# Patient Record
Sex: MALE | Race: WHITE | Employment: OTHER | URBAN - METROPOLITAN AREA
[De-identification: names, ages, dates, MRNs, and addresses within clinical notes are randomized per-mention and may not be internally consistent; named-entity substitution may affect disease eponyms.]

---

## 2020-10-12 ENCOUNTER — HOSPITAL ENCOUNTER (OUTPATIENT)
Age: 75
Setting detail: OUTPATIENT SURGERY
Discharge: HOME | End: 2020-10-12
Attending: SPECIALIST | Admitting: SPECIALIST
Payer: MEDICARE

## 2020-10-12 VITALS
RESPIRATION RATE: 20 BRPM | HEIGHT: 75 IN | BODY MASS INDEX: 33.14 KG/M2 | TEMPERATURE: 97.8 F | OXYGEN SATURATION: 98 % | WEIGHT: 266.54 LBS | DIASTOLIC BLOOD PRESSURE: 69 MMHG | HEART RATE: 83 BPM | SYSTOLIC BLOOD PRESSURE: 110 MMHG

## 2020-10-12 DIAGNOSIS — I48.91 ATRIAL FIBRILLATION (HCC): ICD-10-CM

## 2020-10-12 PROCEDURE — 99152 MOD SED SAME PHYS/QHP 5/>YRS: CPT | Performed by: SPECIALIST

## 2020-10-12 PROCEDURE — 77030004532 HC CATH ANGI DX IMP BSC -A: Performed by: SPECIALIST

## 2020-10-12 PROCEDURE — 77030003390 HC NDL ANGI MRTM -A: Performed by: SPECIALIST

## 2020-10-12 PROCEDURE — C1894 INTRO/SHEATH, NON-LASER: HCPCS | Performed by: SPECIALIST

## 2020-10-12 PROCEDURE — 74011000250 HC RX REV CODE- 250: Performed by: SPECIALIST

## 2020-10-12 PROCEDURE — C1760 CLOSURE DEV, VASC: HCPCS | Performed by: SPECIALIST

## 2020-10-12 PROCEDURE — 93458 L HRT ARTERY/VENTRICLE ANGIO: CPT | Performed by: SPECIALIST

## 2020-10-12 PROCEDURE — 74011000636 HC RX REV CODE- 636: Performed by: SPECIALIST

## 2020-10-12 PROCEDURE — 74011250636 HC RX REV CODE- 250/636: Performed by: SPECIALIST

## 2020-10-12 RX ORDER — HYDROCORTISONE SODIUM SUCCINATE 100 MG/2ML
100 INJECTION, POWDER, FOR SOLUTION INTRAMUSCULAR; INTRAVENOUS
Status: DISCONTINUED | OUTPATIENT
Start: 2020-10-12 | End: 2020-10-12 | Stop reason: HOSPADM

## 2020-10-12 RX ORDER — FUROSEMIDE 40 MG/1
40 TABLET ORAL DAILY
COMMUNITY
End: 2022-02-08

## 2020-10-12 RX ORDER — FENTANYL CITRATE 50 UG/ML
INJECTION, SOLUTION INTRAMUSCULAR; INTRAVENOUS AS NEEDED
Status: DISCONTINUED | OUTPATIENT
Start: 2020-10-12 | End: 2020-10-12 | Stop reason: HOSPADM

## 2020-10-12 RX ORDER — ASCORBIC ACID 500 MG
500 TABLET ORAL EVERY MORNING
COMMUNITY

## 2020-10-12 RX ORDER — LEVOTHYROXINE SODIUM 88 UG/1
88 TABLET ORAL
COMMUNITY

## 2020-10-12 RX ORDER — LIDOCAINE HYDROCHLORIDE 10 MG/ML
INJECTION INFILTRATION; PERINEURAL AS NEEDED
Status: DISCONTINUED | OUTPATIENT
Start: 2020-10-12 | End: 2020-10-12 | Stop reason: HOSPADM

## 2020-10-12 RX ORDER — CHOLECALCIFEROL (VITAMIN D3) 125 MCG
2000 CAPSULE ORAL EVERY MORNING
COMMUNITY

## 2020-10-12 RX ORDER — HEPARIN SODIUM 200 [USP'U]/100ML
INJECTION, SOLUTION INTRAVENOUS
Status: COMPLETED | OUTPATIENT
Start: 2020-10-12 | End: 2020-10-12

## 2020-10-12 RX ORDER — LANOLIN ALCOHOL/MO/W.PET/CERES
400 CREAM (GRAM) TOPICAL DAILY
COMMUNITY

## 2020-10-12 RX ORDER — SODIUM CHLORIDE 9 MG/ML
75 INJECTION, SOLUTION INTRAVENOUS CONTINUOUS
Status: DISCONTINUED | OUTPATIENT
Start: 2020-10-12 | End: 2020-10-12 | Stop reason: HOSPADM

## 2020-10-12 RX ORDER — TAMSULOSIN HYDROCHLORIDE 0.4 MG/1
0.8 CAPSULE ORAL DAILY
COMMUNITY
End: 2022-02-08

## 2020-10-12 RX ORDER — OMEGA-3S/DHA/EPA/FISH OIL 350-600 MG
600 CAPSULE ORAL
COMMUNITY

## 2020-10-12 RX ORDER — SODIUM CHLORIDE 0.9 % (FLUSH) 0.9 %
5-40 SYRINGE (ML) INJECTION EVERY 8 HOURS
Status: DISCONTINUED | OUTPATIENT
Start: 2020-10-12 | End: 2020-10-12 | Stop reason: HOSPADM

## 2020-10-12 RX ORDER — DIGOXIN 125 MCG
0.12 TABLET ORAL DAILY
COMMUNITY
End: 2022-02-08

## 2020-10-12 RX ORDER — DIPHENHYDRAMINE HYDROCHLORIDE 50 MG/ML
25 INJECTION, SOLUTION INTRAMUSCULAR; INTRAVENOUS
Status: DISCONTINUED | OUTPATIENT
Start: 2020-10-12 | End: 2020-10-12 | Stop reason: HOSPADM

## 2020-10-12 RX ORDER — MIDAZOLAM HYDROCHLORIDE 1 MG/ML
INJECTION, SOLUTION INTRAMUSCULAR; INTRAVENOUS AS NEEDED
Status: DISCONTINUED | OUTPATIENT
Start: 2020-10-12 | End: 2020-10-12 | Stop reason: HOSPADM

## 2020-10-12 RX ORDER — SODIUM CHLORIDE 0.9 % (FLUSH) 0.9 %
5-40 SYRINGE (ML) INJECTION AS NEEDED
Status: DISCONTINUED | OUTPATIENT
Start: 2020-10-12 | End: 2020-10-12 | Stop reason: HOSPADM

## 2020-10-12 RX ORDER — BISMUTH SUBSALICYLATE 262 MG
1 TABLET,CHEWABLE ORAL DAILY
COMMUNITY

## 2020-10-12 RX ORDER — POTASSIUM CITRATE 10 MEQ/1
10 TABLET, EXTENDED RELEASE ORAL
COMMUNITY
End: 2022-02-08

## 2020-10-12 NOTE — DISCHARGE INSTRUCTIONS
Cardiac Catheterization/Angiography Discharge Instructions    *Check the puncture site frequently for swelling or bleeding. If you see any bleeding, lie down and apply pressure over the area with a clean town or washcloth. Notify your doctor for any redness, swelling, drainage or oozing from the puncture site. Notify your doctor for any fever or chills. *If the leg or arm with the puncture becomes cold, numb or painful, call Dr Judy Kasper at  [phone]    *Activity should be limited for the next 48 hours. Climb stairs as little as possible and avoid any stooping, bending or strenuous activity for 48 hours. No heavy lifting (anything over 10 pounds) for three days. *Do not drive for 48 hours. *You may resume your usual diet. Drink more fluids than usual.    *Have a responsible person drive you home and stay with you for at least 24 hours after your heart catheterization/angiography. *You may remove the bandage from your Right and Groin in 24 hours. You may shower in 24 hours. No tub baths, hot tubs or swimming for one week. Do not place any lotions, creams, powders, ointments over the puncture site for one week. You may place a clean band-aid over the puncture site each day for 5 days. Change this daily. Discharge Instructions:     Medications: Activity:     As tolerated except do not lift over 10 pounds for 5 days. Diet:     American Heart Association. Follow-up:     Follow up with Leslie Cox MD on October 26th, 2020 at [address] [place] [address] 33  [phone]      If you smoke, STOP!

## 2020-10-12 NOTE — PROCEDURES
Cath:  Mild (non-obstructive) CAD:     LAD m40 (diffuse)     OM2 40     RCA patent; PDA 30  Severe LV systolic dysfunction     EF 15%     Severe GHK  No AVG, MR 1+  RFA angioseal    F/u with Dr. Carla Valenzuela 10/26/20 @ 1pm.

## 2020-10-12 NOTE — PROGRESS NOTES
1113: Patient arrived. ID and allergies verified verbally with patient. Pt voices understanding of procedure to be performed. Consent obtained. Pt prepped for procedure. Dr. Caryl Carreon at bedside. 1249: TRANSFER - OUT REPORT:    Verbal report given to [facility], RN (name) on Danielle Adair  being transferred to Cath Lab (unit) for routine progression of care       Report consisted of patients Situation, Background, Assessment and   Recommendations(SBAR). Information from the following report(s) SBAR was reviewed with the receiving nurse. Lines:   Peripheral IV 10/12/20 Left Forearm (Active)   Site Assessment Clean, dry, & intact 10/12/20 1131   Phlebitis Assessment 0 10/12/20 1131   Dressing Status Clean, dry, & intact 10/12/20 1131   Hub Color/Line Status Pink 10/12/20 1131        Opportunity for questions and clarification was provided. Patient transported with:   Registered Nurse      [phone]: TRANSFER - IN REPORT:    Verbal report received from [facility], RN (name) on Reggy Imus  being received from Cath Lab (unit) for routine progression of care      Report consisted of patients Situation, Background, Assessment and   Recommendations(SBAR). Information from the following report(s) SBAR, Procedure Summary, MAR and Recent Results was reviewed with the receiving nurse. Opportunity for questions and clarification was provided. Assessment completed upon patients arrival to unit and care assumed. 1525: Discharge instructions reviewed with patient. Time given to ask questions or express concerns. Dr. Willie Leon office called as patient's wife and daughter had questions regarding home medications. Spoke with Antonieta Rayo RN and she stated that she would give patient's wife a call at [phone].    [phone]:  Patient's right groin site is dry and intact with no signs of hematoma. Removed peripheral line and telemetry. V/S stable. Patient collected all belongings.

## 2020-10-12 NOTE — Clinical Note
TRANSFER - OUT REPORT:     Verbal report given to: CLPO called. Bedside report to be given. Report consisted of patient's Situation, Background, Assessment and   Recommendations(SBAR). Opportunity for questions and clarification was provided. Patient transported with a Registered Nurse and [address] [place] [address] / Patient [facility]. Patient transported to: [facility].

## 2020-10-12 NOTE — Clinical Note
Single view of the left ventricle obtained using power injection. Total volume = 40 mL. Rate = 10 mL/sec. Pressure = 900 PSI. Rate of rise = 0.5 sec.

## 2020-10-12 NOTE — PROGRESS NOTES
Pt sat on side of bed then ambulated to around unit and to restroom. Voided. Returned to chair. R groin site dressing dry and intact. No bleeding or hematoma. Pt voices no complaints.

## 2020-10-12 NOTE — H&P
Date of Surgery Update:  Abbey Caldwell was seen and examined. History and physical has been reviewed. The patient has been examined. There have been no significant clinical changes since the completion of the originally dated History and Physical.    Signed By: Linh Dumont MD     October 12, 2020 11:11 AM       +CP  EBT (723/20): 387  Echo (9/25/20):  EF 15-20 with severe GHK. Cardiolite (9/29/20): Fixed inf defect (diaphragm), EF 26%. Cloteal Early 1945   Office/Outpatient Visit  Visit Date: Wed, Oct 7, 2020 1:30 pm  Provider: Soheila Rivers MD (Assistant: Silva Hanson MA )  [facility])[address] [address] [phone]    Electronically signed by Suzie Sahu MD on  10/07/2020 02:12:12 PM                           Subjective:    CC: Mr. Aguila Buitrago is a 69year old White male. His primary care physician is Cj Tenorio MD.  This is a 2 week follow-up visit. Since his last visit, he has had the following testing: nuclear stress test (9/29/20). He was admitted to [facility] on 09/24/2020 w/ A-Fib, CHF and discharged on 09/25/2020. Medication list reviewed. verbalized meds with pt The primary reason for today's visit is evaluation of the following: atrial fibrillation: dx'd in 9/23/20; . He has a history of coronary artery disease of the native coronary artery, hypercholesterolemia, and hypertension. pt states he has a dry cough and chest tightness/indegestion    HPI:       Atrial Fibrillation  Specific Type Regarding persistent atrial fibrillation: His [id number] score is 2. Current related medications include digoxin for rate control and Xarelto (Rivaroxaban). Coronary Artery Disease: Currently, his treatment regimen consists of a beta blocker and a diuretic ( furosemide ). Current symptoms include chest pain and shortness of breath.   The patient has had prior EBT score of 387 on 7/23/20 and Fe Ricci Myoview: EF 26%. Hypercholesterolemia:  MD Notes: Intolerant to statins, Zetia, and Niaspan Current treatment includes diet. Regarding hypertension:  MD Notes: Low BP while in the hospital  Type Primary Hypertension Currently, his treatment regimen consists of a diuretic ( furosemide ). Regarding Heart Failure: Specifically this is systolic heart failure chronic. A transthoracic echocardiogram has been done ( official interpretation shows mitral regurgitation ( mild ), reduced ejection fraction, EF= 15-20%, and PA 40 ). BNP 1642           Regarding dyspnea/shortness of breath: Associated symptoms include chest tightness. Regarding chest pain: Typically, individual episodes of chest pain last nearly all day long. He characterizes the pain as tightness. Coronary Artery Disease: Current symptoms include angina and shortness of breath. MD Notes: Abnormal Lexiscan Myovew and echocardiogram.  Abnormal result of other cardiovascular function study noted. Regarding dyspnea/shortness of breath: This tends to be worse with exertion. The shortness of breath is better rest.    Past Medical History / Family History / Social History:     Last Reviewed on 10/07/2020 01:42 PM by Liliane Wayne  Past Medical History:     Congestive Heart Failure  Coronary artery disease: dx by EBT 07/23/20; Hyperlipidemia  Hypertension   Sleep Apnea: uses CPAP; Benign Prostatic Hypertrophy  Erectile Dysfunction   Osteoarthritis   Hypothyroidism   Fall 11/2019 - chronic back pain, steroid injections, PT     Past Cardiac Procedures/Tests:  Nuclear Study:  9/29/2020 Abnormal myocardial perfusion Tetrofosmin Myoview SPECT imaging showing depressed LV systolic function. By gated SPECT, post exercise global LVEF was severely reduced. LVEF of 26%. The interpretation of the study was confounded by diaphragmatic attenuation.   EBT score of 387 on 7/23/20     Surgical History: Surgical/Procedural History: Tonsillectomy   Arthroscopy: L knee  Umbilical hernia  R rotator cuff     Family History:   Father:   ; Positive for pacemaker; Mother: Healthy;    Brother(s):  Positive for Coronary Artery Disease and Myocardial Infarction;     Social History:   Social History:   Occupation: Retired   Children: 1 child     Tobacco/Alcohol/Supplements:   Last Reviewed on 10/07/2020 01:42 PM by [employer]  TOBACCO/ALCOHOL/SUPPLEMENTS   Tobacco: Former smoker smoked for 15 years, quit at age 29   Alcohol: Drinks alcohol occasionally. Caffeine:  He admits to consuming caffeine via coffee ( 2 servings per day ). Substance Abuse History:   Last Reviewed on 10/07/2020 01:42 PM by [employer]  Substance Use/Abuse:   None     Mental Health History:   Last Reviewed on 10/07/2020 01:42 PM by [employer]    Communicable Diseases (eg STDs):    Last Reviewed on 10/07/2020 01:42 PM by [employer]    Current Problems:   Last Reviewed on 10/07/2020 01:42 PM by [employer]  Body mass index 30+ - obesity  Benign prostatic hypertrophy with outflow obstruction  Overweight  Degeneration of lumbar intervertebral disc  Peripheral nerve disease  Vitamin D deficiency  Osteoarthritis  Impotence  Hypothyroidism  Hyperlipidemia  Essential hypertension  Pulmonary hypertension  Obstructive sleep apnea syndrome  Chronic pain  Essential (primary) hypertension  Shortness of breath  Pure hypercholesterolemia  Chest pain, unspecified  Abnormal electrocardiogram [ECG] [EKG]  Atherosclerotic heart disease of native coronary artery without angina pectoris  Unspecified atrial fibrillation  Cardiac murmur, unspecified  TIA  Pure hyperglyceridemia  Hypercholesterolemia  Essential hypertension, benign  Coronary artery disease, of native coronary artery  Atrial fibrillation  Shortness of Breath  Abnormal EKG  Chronic systolic (congestive) heart failure    Allergies:   Last Reviewed on 10/07/2020 01:42 PM by Octavio Shah  Lipitor:    Niaspan Extended-Release:    Zetia:      Current Medications:   Last Reviewed on 10/07/2020 01:42 PM by Octavio Shah  Multvitamin   digoxin 125 mcg (0.125 mg) oral tablet [take 1 tablet (125 mcg) by oral route once daily in the morning]  furosemide 40 mg oral tablet [take 1 tablet (40 mg) daily in the morning]  Vitamin C  [1 po qd]  Fish Oil  [1 po qd]  magnesium oxide 400 mg magnesium oral tablet [1 po qd]  potassium citrate 10 mEq (1,080 mg) oral tablet, extended release [take 1 tablet (10 meq) by oral route 3 times per day]  tamsulosin 0.4 mg oral capsule [TK 2 CS PO D 30 MIN AFTER THE SAME MEAL]  Synthroid 88 mcg oral tablet [TAKE 1 TABLET BY MOUTH EVERY MORNING ON EMPTY STOMACH *BRAND ONLY*]  Vitamin D3  [2,000 IU daily]  Xarelto 20 mg oral tablet [take 1 tablet (20 mg) by oral route once daily with the evening meal]    Objective:    Vitals:     Historical:   9/23/2020  BP:   104/76 mm Hg ( (left arm, , standing, );) 9/23/2020  BP:   115/83 mm Hg ( (left arm, , sitting, );) 9/23/2020  Wt:   277lbs  Current: 10/7/2020 1:40:02 PM  Ht:  6 ft, 3 in; Wt: 260 lbs;  BMI: 32. 5BP: 142/87 mm Hg (left arm, standing);  P: 150 bpm;  sCr: 1.05 mg/dL;  GFR: 76.17    Repeat:   1:41:39 PM  BP:   100/74mm Hg (left arm, sitting)   Exams:   GENERAL:  Alert, oriented to person, place and time. HEENT:  Pinkish palpebral  conjunctivae. Anicteric sclerae. NECK:  No jugular vein engorgement. No bruit. CHEST: Equal expansion. Clear breath sounds. No rales, no wheezing. Heart: Irregular rhythm. Grade 2/6 systolic ejection murmur at the left sternal border area. ABDOMEN:  Soft. Normal active bowel sounds. No tenderness. EXTREMITIES:  No pitting pedal edema. Equal pulses bilaterally. NEURO:  Grossly intact.       Lab/Test Results:     Sodium, Serum: 138 (09/25/2020),   Potassium, Serum: 3.9 (09/25/2020),   Glucose Serum: 83 (09/25/2020),   BUN serum/plasma ([facility]): 22 (09/25/2020),   Creatinine, Serum: 1.05 (09/25/2020),   Magnesium, Serum: 2.3 (09/25/2020),   WBC count: 7.51 (09/25/2020),   RBC count: 3.76 (09/25/2020),   Hgb: 11.7 (09/25/2020),   Hct: 38.2 (09/25/2020),   MCV: 101.6 (09/25/2020),   Platelets: 761 (42/45/5480),       Procedures:   Unspecified atrial fibrillation    ECG INTERPRETATION: See scanned EKG for results. Assessment:     I48.91   Unspecified atrial fibrillation     I25.10   Atherosclerotic heart disease of native coronary artery without angina pectoris     E78.0   Pure hypercholesterolemia     I10   Essential (primary) hypertension     N60.53   Chronic systolic (congestive) heart failure     R06.00   Dyspnea, unspecified     R07.9   Chest pain, unspecified     I25.118   Atherosclerotic heart disease of native coronary artery with other forms of angina pectoris     R94.39   Abnormal result of other cardiovascular function study     R06.00   Dyspnea, unspecified       ORDERS:     Procedures Ordered:     [id number]  Education and train for pt self-mgmt by qualified, nonphysician, ea 30 minutes; individual pt  (Send-Out)          61855  Electrocardiogram, routine with at least 12 leads; with interpretation and report  (In-House)          XCATH  Cardiac Cath  (In-House)          Other Orders:     BRYON  CHADS  (Send-Out)          [id number]  Queried Patient for Tobacco Use  (Send-Out)              Plan:     Unspecified atrial fibrillation    *  Plan of care for atrial fibrillation: Follow up visit CHADS score remains greater than 1. He is tolerating a rate/rhythm control  with prescribed medications. 1.  Medication list has been reviewed. Change the following medication(s):  Hold Xarelto 2 days prior to catheterization and take aspirin 81 mg daily while off Xarelto. Start the following medication(s):  metoprolol 25 mg 1 tablet 2x daily. Smoking Status:  Nonsmoker   2. Advised the patient regarding diet, exercise, and lifestyle modification.     3.  The patient to call the office if there is any change in his cardiac symptoms. 4.  Explained to the patient the importance of controlling his cardiac risk factors. Testing/Procedures: Cardiac Catheterization  Explained to the patient the indication, procedure, risks, and benefits of cardiac catheterization. The patient understands  and wishes to proceed with the cath to be performed as an outpatient at [facility] by Dr. Hugo Lan. Schedule a follow up appointment in 2 weeks. Limit caffeine intake (max. 2 cups per day). I also recommend that you monitor your BP. Target BP less than 130/80. The above note was transcribed by Rody Sherwood and authenticated by Dr. Adrián Sena prior to sign off.

## 2022-02-08 ENCOUNTER — HOSPITAL ENCOUNTER (OUTPATIENT)
Dept: PREADMISSION TESTING | Age: 77
Discharge: HOME | End: 2022-02-08
Payer: MEDICARE

## 2022-02-08 LAB
SARS-COV-2, XPLCVT: NOT DETECTED
SOURCE, COVRS: NORMAL

## 2022-02-08 PROCEDURE — U0005 INFEC AGEN DETEC AMPLI PROBE: HCPCS

## 2022-02-08 RX ORDER — SILODOSIN 8 MG/1
8 CAPSULE ORAL
COMMUNITY

## 2022-02-08 RX ORDER — SACUBITRIL AND VALSARTAN 24; 26 MG/1; MG/1
0.5 TABLET, FILM COATED ORAL 2 TIMES DAILY
COMMUNITY

## 2022-02-08 NOTE — PERIOP NOTES
[address] [address] [facility]   MAIN OR                                  [phone]   MAIN PRE OP                          [phone]                                                                                AMBULATORY PRE OP          [phone]  PRE-ADMISSION TESTING    [phone]   Surgery Date: Friday 2/11/22       Is surgery arrival time given by surgeon? NO  If NO, [facility] staff will call you between 3 and 7pm the day before your surgery with your arrival time. (If your surgery is on a Monday, we will call you the Friday before.)    Call [phone] after 7pm Monday-Friday if you did not receive this call. INSTRUCTIONS BEFORE YOUR SURGERY   When You  Arrive Arrive at the 2[address] [facility] on the day of your surgery  Have your insurance card, photo ID, and any copayment (if needed)   Food   and   Drink NO food or drink after midnight the night before surgery    This means NO water, gum, mints, coffee, juice, etc.  No alcohol (beer, wine, liquor) 24 hours before and after surgery   Medications to   TAKE   Morning of Surgery MEDICATIONS TO TAKE THE MORNING OF SURGERY WITH A SIP OF WATER:    SILODOSIN   LEVOTHYROXINE   ENTRESTO   Medications  To  STOP      7 days before surgery  Non-Steroidal anti-inflammatory Drugs (NSAID's): for example, Ibuprofen (Advil, Motrin), Naproxen (Aleve)   Aspirin, if taking for pain    Herbal supplements, vitamins, and fish oil   Other:STOP XARELTO 2 DAYS PRIOR TO SURGERY PER DR CHANDRA  (Pain medications not listed above, including Tylenol may be taken)   Blood  Thinners  If you take  Aspirin, Plavix, Coumadin, or any blood-thinning or anti-blood clot medicine, talk to the doctor who prescribed the medications for pre-operative instructions.    Bathing Clothing  Jewelry  Valuables      If you shower the morning of surgery, please do not apply anything to your skin (lotions, powders, deodorant, or makeup, especially mascara)   Follow Chlorhexidine Care Fusion body wash instructions provided to you during PAT appointment. Begin 3 days prior to surgery.  Do not shave or trim anywhere 24 hours before surgery   Wear your hair loose or down; no pony-tails, buns, or metal hair clips   Wear loose, comfortable, clean clothes   Wear glasses instead of contacts  [employer] money, valuables, and jewelry, including body piercings, at home   If you were given an [employer], bring it on day of surgery. Going Home - or Spending the Night  SAME-DAY SURGERY: You must have a responsible adult drive you home and stay with you 24 hours after surgery   ADMITS: If your doctor is keeping you in the hospital after surgery, leave personal belongings/luggage in your car until you have a hospital room number. Hospital discharge time is 12 noon  Drivers must be here before 12 noon unless you are told differently   Special Instructions It is now mandated that all surgical patients be tested for COVID-19 prior to surgery. Testing has to be exactly 4 days prior to surgery. Your COVID test date is TUESDAY 2/8/22 between 8:00 am and 11:00 am.       COVID testing will be performed curbside at the [facility] [address]. There will be signs leading you to the testing site. You will need to bring a photo ID with you to be swabbed. Patients are advised to self-quarantine at home after testing and prior to your surgery date. You will be notified if your results are positive.     What to watch for:   Coronavirus (COVID-19) affects different people in different ways   It also appears with a wide range of symptoms from mild to severe   Signs usually appear 2-14 days after exposure     If you develop any of the following, notify your doctor immediately:  o Fever  o Chills, with or without a shiver  o Muscle pain  o Headache  o Sore throat  o Dry cough  o New loss of taste or smell  o Tiredness      If you develop any of the following, call [phone]:  o Shortness of breath  o Difficulty breathing  o Chest pain  o New confusion  Blueness of fingers and/or lips     Follow all instructions so your surgery wont be cancelled. Please, be on time. If a situation occurs and you are delayed the day of surgery, call [phone] or [phone]. If your physical condition changes (like a fever, cold, flu, etc.) call your surgeon. Home medication(s) reviewed and verified via LIST   VERBAL   during PAT appointment. The patient was contacted by  IN-PERSON  The patient verbalizes understanding of all instructions and  DOES NOT   need reinforcement.

## 2022-02-10 ENCOUNTER — ANESTHESIA EVENT (OUTPATIENT)
Dept: SURGERY | Age: 77
End: 2022-02-10
Payer: MEDICARE

## 2022-02-11 ENCOUNTER — HOSPITAL ENCOUNTER (OUTPATIENT)
Age: 77
Setting detail: OUTPATIENT SURGERY
Discharge: HOME | End: 2022-02-11
Attending: ORTHOPAEDIC SURGERY | Admitting: ORTHOPAEDIC SURGERY
Payer: MEDICARE

## 2022-02-11 ENCOUNTER — ANESTHESIA (OUTPATIENT)
Dept: SURGERY | Age: 77
End: 2022-02-11
Payer: MEDICARE

## 2022-02-11 VITALS
TEMPERATURE: 98 F | HEART RATE: 38 BPM | BODY MASS INDEX: 36.54 KG/M2 | RESPIRATION RATE: 18 BRPM | OXYGEN SATURATION: 92 % | HEIGHT: 75 IN | SYSTOLIC BLOOD PRESSURE: 113 MMHG | WEIGHT: 293.87 LBS | DIASTOLIC BLOOD PRESSURE: 60 MMHG

## 2022-02-11 DIAGNOSIS — G56.01 CARPAL TUNNEL SYNDROME, RIGHT: Primary | ICD-10-CM

## 2022-02-11 PROCEDURE — 76030000000 HC AMB SURG OR TIME 0.5 TO 1: Performed by: ORTHOPAEDIC SURGERY

## 2022-02-11 PROCEDURE — 76210000057 HC AMBSU PH II REC 1 TO 1.5 HR: Performed by: ORTHOPAEDIC SURGERY

## 2022-02-11 PROCEDURE — 2709999900 HC NON-CHARGEABLE SUPPLY

## 2022-02-11 PROCEDURE — 77030000032 HC CUF TRNQT ZIMM -B: Performed by: ORTHOPAEDIC SURGERY

## 2022-02-11 PROCEDURE — 2709999900 HC NON-CHARGEABLE SUPPLY: Performed by: ORTHOPAEDIC SURGERY

## 2022-02-11 PROCEDURE — 74011250636 HC RX REV CODE- 250/636: Performed by: ANESTHESIOLOGY

## 2022-02-11 PROCEDURE — 76060000061 HC AMB SURG ANES 0.5 TO 1 HR: Performed by: ORTHOPAEDIC SURGERY

## 2022-02-11 PROCEDURE — 74011250636 HC RX REV CODE- 250/636: Performed by: NURSE ANESTHETIST, CERTIFIED REGISTERED

## 2022-02-11 PROCEDURE — 74011250636 HC RX REV CODE- 250/636: Performed by: PHYSICIAN ASSISTANT

## 2022-02-11 PROCEDURE — 74011000250 HC RX REV CODE- 250: Performed by: NURSE ANESTHETIST, CERTIFIED REGISTERED

## 2022-02-11 PROCEDURE — 77030003601 HC NDL NRV BLK BBMI -A: Performed by: ANESTHESIOLOGY

## 2022-02-11 PROCEDURE — 77030002933 HC SUT MCRYL J&J -A: Performed by: ORTHOPAEDIC SURGERY

## 2022-02-11 RX ORDER — SODIUM CHLORIDE, SODIUM LACTATE, POTASSIUM CHLORIDE, CALCIUM CHLORIDE 600; 310; 30; 20 MG/100ML; MG/100ML; MG/100ML; MG/100ML
125 INJECTION, SOLUTION INTRAVENOUS CONTINUOUS
Status: DISCONTINUED | OUTPATIENT
Start: 2022-02-11 | End: 2022-02-11 | Stop reason: HOSPADM

## 2022-02-11 RX ORDER — SODIUM CHLORIDE 0.9 % (FLUSH) 0.9 %
5-40 SYRINGE (ML) INJECTION AS NEEDED
Status: DISCONTINUED | OUTPATIENT
Start: 2022-02-11 | End: 2022-02-11 | Stop reason: HOSPADM

## 2022-02-11 RX ORDER — LIDOCAINE HYDROCHLORIDE 20 MG/ML
INJECTION, SOLUTION EPIDURAL; INFILTRATION; INTRACAUDAL; PERINEURAL AS NEEDED
Status: DISCONTINUED | OUTPATIENT
Start: 2022-02-11 | End: 2022-02-11 | Stop reason: HOSPADM

## 2022-02-11 RX ORDER — SODIUM CHLORIDE 0.9 % (FLUSH) 0.9 %
5-40 SYRINGE (ML) INJECTION EVERY 8 HOURS
Status: DISCONTINUED | OUTPATIENT
Start: 2022-02-11 | End: 2022-02-11 | Stop reason: HOSPADM

## 2022-02-11 RX ORDER — NALOXONE HYDROCHLORIDE 0.4 MG/ML
0.04 INJECTION, SOLUTION INTRAMUSCULAR; INTRAVENOUS; SUBCUTANEOUS
Status: DISCONTINUED | OUTPATIENT
Start: 2022-02-11 | End: 2022-02-11 | Stop reason: HOSPADM

## 2022-02-11 RX ORDER — ALBUTEROL SULFATE 0.83 MG/ML
2.5 SOLUTION RESPIRATORY (INHALATION) AS NEEDED
Status: DISCONTINUED | OUTPATIENT
Start: 2022-02-11 | End: 2022-02-11 | Stop reason: HOSPADM

## 2022-02-11 RX ORDER — DIPHENHYDRAMINE HYDROCHLORIDE 50 MG/ML
12.5 INJECTION, SOLUTION INTRAMUSCULAR; INTRAVENOUS AS NEEDED
Status: DISCONTINUED | OUTPATIENT
Start: 2022-02-11 | End: 2022-02-11 | Stop reason: HOSPADM

## 2022-02-11 RX ORDER — TRAMADOL HYDROCHLORIDE 50 MG/1
50 TABLET ORAL
Qty: 10 TABLET | Refills: 0 | Status: SHIPPED | OUTPATIENT
Start: 2022-02-11 | End: 2022-02-16

## 2022-02-11 RX ORDER — ONDANSETRON 8 MG/1
8 TABLET, ORALLY DISINTEGRATING ORAL
Qty: 20 TABLET | Refills: 0 | Status: SHIPPED | OUTPATIENT
Start: 2022-02-11

## 2022-02-11 RX ORDER — GLYCOPYRROLATE 0.2 MG/ML
INJECTION INTRAMUSCULAR; INTRAVENOUS AS NEEDED
Status: DISCONTINUED | OUTPATIENT
Start: 2022-02-11 | End: 2022-02-11 | Stop reason: HOSPADM

## 2022-02-11 RX ORDER — HYDROMORPHONE HYDROCHLORIDE 1 MG/ML
.25-1 INJECTION, SOLUTION INTRAMUSCULAR; INTRAVENOUS; SUBCUTANEOUS
Status: DISCONTINUED | OUTPATIENT
Start: 2022-02-11 | End: 2022-02-11 | Stop reason: HOSPADM

## 2022-02-11 RX ORDER — PROPOFOL 10 MG/ML
INJECTION, EMULSION INTRAVENOUS
Status: DISCONTINUED | OUTPATIENT
Start: 2022-02-11 | End: 2022-02-11 | Stop reason: HOSPADM

## 2022-02-11 RX ORDER — LIDOCAINE HYDROCHLORIDE 10 MG/ML
0.1 INJECTION, SOLUTION EPIDURAL; INFILTRATION; INTRACAUDAL; PERINEURAL AS NEEDED
Status: DISCONTINUED | OUTPATIENT
Start: 2022-02-11 | End: 2022-02-11 | Stop reason: HOSPADM

## 2022-02-11 RX ORDER — FLUMAZENIL 0.1 MG/ML
0.2 INJECTION INTRAVENOUS
Status: DISCONTINUED | OUTPATIENT
Start: 2022-02-11 | End: 2022-02-11 | Stop reason: HOSPADM

## 2022-02-11 RX ORDER — ONDANSETRON 2 MG/ML
4 INJECTION INTRAMUSCULAR; INTRAVENOUS AS NEEDED
Status: DISCONTINUED | OUTPATIENT
Start: 2022-02-11 | End: 2022-02-11 | Stop reason: HOSPADM

## 2022-02-11 RX ORDER — FENTANYL CITRATE 50 UG/ML
25 INJECTION, SOLUTION INTRAMUSCULAR; INTRAVENOUS
Status: DISCONTINUED | OUTPATIENT
Start: 2022-02-11 | End: 2022-02-11 | Stop reason: HOSPADM

## 2022-02-11 RX ORDER — FENTANYL CITRATE 50 UG/ML
INJECTION, SOLUTION INTRAMUSCULAR; INTRAVENOUS AS NEEDED
Status: DISCONTINUED | OUTPATIENT
Start: 2022-02-11 | End: 2022-02-11 | Stop reason: HOSPADM

## 2022-02-11 RX ORDER — PROPOFOL 10 MG/ML
INJECTION, EMULSION INTRAVENOUS AS NEEDED
Status: DISCONTINUED | OUTPATIENT
Start: 2022-02-11 | End: 2022-02-11 | Stop reason: HOSPADM

## 2022-02-11 RX ADMIN — GLYCOPYRROLATE 0.1 MG: 0.2 INJECTION INTRAMUSCULAR; INTRAVENOUS at 13:33

## 2022-02-11 RX ADMIN — PROPOFOL 2 MG: 10 INJECTION, EMULSION INTRAVENOUS at 13:21

## 2022-02-11 RX ADMIN — FENTANYL CITRATE 50 MCG: 50 INJECTION INTRAMUSCULAR; INTRAVENOUS at 13:21

## 2022-02-11 RX ADMIN — MEPIVACAINE HYDROCHLORIDE 20 ML: 20 INJECTION, SOLUTION EPIDURAL; INFILTRATION at 13:24

## 2022-02-11 RX ADMIN — PROPOFOL 40 MCG/KG/MIN: 10 INJECTION, EMULSION INTRAVENOUS at 13:34

## 2022-02-11 RX ADMIN — PROPOFOL 30 MG: 10 INJECTION, EMULSION INTRAVENOUS at 13:34

## 2022-02-11 RX ADMIN — SODIUM CHLORIDE, POTASSIUM CHLORIDE, SODIUM LACTATE AND CALCIUM CHLORIDE 125 ML/HR: 600; 310; 30; 20 INJECTION, SOLUTION INTRAVENOUS at 11:58

## 2022-02-11 RX ADMIN — Medication 3 G: at 13:31

## 2022-02-11 RX ADMIN — LIDOCAINE HYDROCHLORIDE 20 MG: 20 INJECTION, SOLUTION INTRAVENOUS at 13:34

## 2022-02-11 NOTE — ANESTHESIA PROCEDURE NOTES
Peripheral Block    Start time: 2/11/2022 1:20 PM  End time: 2/11/2022 1:24 PM  Performed by: Juani Jimenez MD  Authorized by: Juani Jimenez MD       Pre-procedure:    Indications: at surgeon's request and primary anesthetic    Preanesthetic Checklist: patient identified, risks and benefits discussed, timeout performed and anesthesia consent given    Timeout Time: 13:18 EST          Block Type:   Block Type:  Supraclavicular  Monitoring:  Continuous pulse ox, frequent vital sign checks, heart rate, responsive to questions and oxygen  Injection Technique:  Single shot  Procedures: ultrasound guided and nerve stimulator    Patient Position: supine  Prep: chlorhexidine    Location:  Supraclavicular  Needle Type:  Stimuplex  Needle Gauge:  22 G  Needle Localization:  Anatomical landmarks and ultrasound guidance    Assessment:  Number of attempts:  1  Injection Assessment:  Incremental injection every 5 mL, local visualized surrounding nerve on ultrasound, negative aspiration for blood, no paresthesia and no intravascular symptoms  Patient tolerance:  Patient tolerated the procedure well with no immediate complications

## 2022-02-11 NOTE — ANESTHESIA PREPROCEDURE EVALUATION
Relevant Problems   No relevant active problems       Anesthetic History   No history of anesthetic complications            Review of Systems / Medical History  Patient summary reviewed and pertinent labs reviewed    Pulmonary        Sleep apnea: CPAP           Neuro/Psych   Within defined limits           Cardiovascular    Hypertension      CHF  Dysrhythmias : atrial fibrillation  CAD      Comments: EF has improved to about 40%  S/p afib ablation   GI/Hepatic/Renal  Within defined limits              Endo/Other      Hypothyroidism  Obesity, morbid obesity and arthritis     Other Findings            Physical Exam    Airway  Mallampati: III    Neck ROM: normal range of motion   Mouth opening: Normal     Cardiovascular    Rhythm: regular  Rate: normal         Dental  No notable dental hx       Pulmonary  Breath sounds clear to auscultation               Abdominal  GI exam deferred       Other Findings            Anesthetic Plan    ASA: 3  Anesthesia type: regional and MAC - supraclavicular block      Post-op pain plan if not by surgeon: regional    Induction: Intravenous  Anesthetic plan and risks discussed with: Patient and Spouse

## 2022-02-11 NOTE — ANESTHESIA POSTPROCEDURE EVALUATION
Procedure(s):  RIGHT ENDOSCOPIC CARPAL TUNNEL RELEASE (ANES REGIONAL WITH MAC). regional, MAC    Anesthesia Post Evaluation      Multimodal analgesia: multimodal analgesia not used between 6 hours prior to anesthesia start to PACU discharge  Patient location during evaluation: PACU  Patient participation: complete - patient participated  Level of consciousness: awake  Pain management: adequate  Airway patency: patent  Anesthetic complications: no  Cardiovascular status: acceptable, blood pressure returned to baseline and hemodynamically stable  Respiratory status: acceptable  Hydration status: acceptable  Post anesthesia nausea and vomiting:  controlled  Final Post Anesthesia Temperature Assessment:  Normothermia (36.0-37.5 degrees C)      INITIAL Post-op Vital signs:   Vitals Value Taken Time   /58 02/11/22 1450   Temp 36.6 °C (97.9 °F) 02/11/22 1404   Pulse 38 02/11/22 1451   Resp 18 02/11/22 1451   SpO2 90 % 02/11/22 1451   Vitals shown include unvalidated device data.

## 2022-02-11 NOTE — DISCHARGE INSTRUCTIONS
Upper Extremity Surgery Discharge Instructions  Dr. Carissa Mcguire / Shahbaz Ohara PA-C    Please take the time to review the following instructions before you leave the hospital and use them as guidelines during your recovery from surgery. If you have any questions, you may contact my office at [phone] or via [employer] messaging, which is typically the quickest and most direct method. Wound Care / Dressing Change    Please remove your dressings on the third day after your surgery. You may replace your dressing with band-aids. A big, bulky dressing isnt necessary as long as there is no drainage from the incisions. You can put a band-aid or piece of gauze over each incision. It isnt necessary to apply antibiotic ointment to your incisions. If you have steri-strips over you incision, they will start to peel off in 7-10 days as you get them wet. They dont need to be removed before that. When they begin to peel off, you may remove them. Some incisions are closed with dissolvable sutures and are covered with surgical glue, which often has a purple-homero hue. Please do not scrub at or remove this, as it will dissolve and/or peel off over time. Showering / Bathing    Prior to third day from surgery, you may bathe/shower as long as dressing/splint/cast is kept dry. You may shower with your wound uncovered three days after your surgery. You may get your incisions wet in the shower. Do not vigorously scrub your incisions. Apply a clean, dry dressing after you have dried your incisions. Do not take a bath or get into a swimming pool or [facility] until you follow up with Dr. Doug Ross. Do not soak your incisions under water. Activity    Please begin using fingers immediately after surgery, working to improve motion of straightening and flexing your fingers several times per day.     No lifting more than 2 lbs with your affected hand until the sutures come out or are checked at your first post-operative visit. Otherwise, you may proceed with activity as tolerated. Ice and Elevation    Keep your hand elevated continuously for 48 hours after surgery. Your hand/wrist should always be above the level of your heart. Sleep with your arm elevated on several pillows to minimize swelling and discomfort. Continue ice consistently for 48 hours after surgery. After 48 hours, you should ice 3 times per day for 20 minutes at a time for the next 5 days. After 1 week from surgery, you may use ice as needed for pain. Diet    You may advance your regular diet as tolerated. Increase your clear liquid intake for the next 2-3 days. Medications    1. You will be given prescriptions for pain medication, and nausea when you are discharged from the hospital. Please use the medications as prescribed. Pain medications may cause constipation - over the counter Colace or Milk of Magnesia may be used as needed. Other possible side effects of pain medications are dizziness, headache, nausea, vomiting, and urinary retention. Discontinue the pain medication if you develop itching, rash, shortness of breath, or difficulties swallowing. If these symptoms become severe or arent relieved by discontinuing the medication, you should seek immediate medical attention. 2. Refills of pain medication are authorized during office hours only (8AM - 5PM Monday through Friday)  3. If you pain medication prescribed at the time of surgery contains Tylenol/Acetaminophen, DO NOT TAKE additional Tylenol/Acetaminophen. Do not exceed 4000mg of Tylenol/Acetaminophen per day. 4. You may resume the medication you were taking prior to your surgery. Pain medication may change the effects of any antidepressant medication you may be taking. If you have any questions about possible interactions between your regular medication and the pain medication, you should consult the physician who prescribes your regular medications.   5. Do not drive while taking pain medication. 6. You were prescribed a nausea medication. It is only necessary to fill this if you are experiencing nausea. Please call the office at [phone] if you have any increasing numbness or tingling, increasing drainage on your dressing, fever greater than 100.5 degrees F, or pain not controlled by medications. If you are experiencing chest pain or shortness of breath, please alert your primary care physician immediately. DISCHARGE SUMMARY from your Nurse      PATIENT INSTRUCTIONS    After general anesthesia or intravenous sedation, for 24 hours or while taking prescription Narcotics:  · Limit your activities  · Do not drive and operate hazardous machinery  · Do not make important personal or business decisions  · Do  not drink alcoholic beverages  · If you have not urinated within 8 hours after discharge, please contact your surgeon on call. Report the following to your surgeon:  · Excessive pain, swelling, redness or odor of or around the surgical area  · Temperature over 100.5  · Nausea and vomiting lasting longer than 4 hours or if unable to take medications  · Any signs of decreased circulation or nerve impairment to extremity: change in color, persistent  numbness, tingling, coldness or increase pain  · Any questions      GOOD HELP TO FIGHT AN INFECTION  Here are a few tip to help reduce the chance of getting an infection after surgery:   Wash Your Hands   Good handwashing is the most important thing you and your caregiver can do.  Wash before and after caring for any wounds. Dry your hand with a clean towel.  Wash with soap and water for at least 20 seconds. A TIP: sing the \"Happy Birthday\" song through one time while washing to help with the timing.  Use a hand  in between washings.      Shower   When your surgeon says it is OK to take a shower, use a new bar of antibacterial soap (if that is what you use, and keep that bar of soap ONLY for your use), or antibacterial body wash.  Use a clean wash cloth or sponge when you bathe.  Dry off with a clean towel  after every bath - be careful around any wounds, skin staples, sutures or surgical glue over/on wounds.  Do not enter swimming pools, hot tubs, lakes, rivers and/or ocean until wounds are healed and your doctor/surgeon says it is OK.  Use Clean Sheets   Sleep on freshly laundered sheets after your surgery.  Keep the surgery site covered with a clean, dry bandage (if instructed to do so). If the bandage becomes soiled, reapply a new, dry, clean bandage.  Do not allow pets to sleep with you while your wound is healing.  Lifestyle Modification and Controlling Your Blood Sugar   Smoking slows wound healing. Stop smoking and limit exposure to second-hand smoke.  High blood sugar slows wound healing. Eat a well-balanced diet to provide proper nutrition while healing   Monitor your blood sugar (if you are a diabetic) and take your medications as you are suppose to so you can control you blood sugar after surgery. COUGH AND DEEP BREATHE    Breathing deeply and coughing are very important exercises to do after surgery. Deep breathing and coughing open the little air tubes and air sacks in your lungs. You take deep breaths every day. You may not even notice - it is just something you do when you sigh or yawn. It is a natural exercise you do to keep these air passages open. After surgery, take deep breaths and cough, on purpose. DIRECTIONS:  · Take 10 to 15 slow deep breaths every hour while awake. · Breathe in deeply, and hold it for 2 seconds. · Exhale slowly through puckered lips, like blowing up a balloon. · After every 4th or 5th deep breath, hug your pillow to your chest or belly and give a hard, deep cough. Yes, it will probably hurt. But doing this exercise is a very important part of healing after surgery.   Take your pain medicine to help you do this exercise without too much pain. Coughing and deep breathing help prevent bronchitis and pneumonia after surgery. If you had chest or belly surgery, use a pillow as a \"hug nirmal\" and hold it tightly to your chest or belly when you cough. ANKLE PUMPS    Ankle pumps increase the circulation of oxygenated blood to your lower extremities and decrease your risk for circulation problems such as blood clots. They also stretch the muscles, tendons and ligaments in your foot and ankle, and prevent joint contracture in the ankle and foot, especially after surgeries on the legs. It is important to do ankle pump exercises regularly after surgery because immobility increases your risk for developing a blood clot. Your doctor may also have you take an Aspirin for the next few days as well. If your doctor did not ask you to take an Aspirin, consult with him before starting Aspirin therapy on your own. The exercise is quite simple. · Slowly point your foot forward, feeling the muscles on the top of your lower leg stretch, and hold this position for 5 seconds. · Next, pull your foot back toward you as far as possible, stretching the calf muscles, and hold that position for 5 seconds. · Repeat with the other foot. · Perform 10 repetitions every hour while awake for both ankles if possible (down and then up with the foot once is one repetition). You should feel gentle stretching of the muscles in your lower leg when doing this exercise. If you feel pain, or your range of motion is limited, don't push too hard. Only go the limit your joint and muscles will let you go. If you have increasing pain, progressively worsening leg warmth or swelling, STOP the exercise and call your doctor. MEDICATION AND   SIDE EFFECT GUIDE    The [address] [place] [address] MEDICATION AND SIDE EFFECT GUIDE was provided to the PATIENT AND CARE PROVIDER.   Information provided includes instruction about drug purpose and common side effects for the following medications:   · Docusate sodium (Colace)  · Ondansetron (Zofran)  · Tramadol (Ultram)        These are general instructions for a healthy lifestyle:    *   Please give a list of your current medications to your Primary Care Provider. *   Please update this list whenever your medications are discontinued, doses are changed, or new medications (including over-the-counter products) are added. *   Please carry medication information at all times in case of emergency situations. About Smoking  No smoking / No tobacco products  Avoid exposure to second hand smoke     Surgeon General's Warning:  Quitting smoking now greatly reduces serious risk to your health. Obesity, smoking, and sedentary lifestyle greatly increases your risk for illness and disease. A healthy diet, regular physical exercise & weight monitoring are important for maintaining a healthy lifestyle. Congestive Heart Failure  You may be retaining fluid if you have a history of heart failure or if you experience any of the following symptoms:  Weight gain of 3 pounds or more overnight or 5 pounds in a week, increased swelling in your hands or feet or shortness of breath while lying flat in bed. Please call your doctor as soon as you notice any of these symptoms; do not wait until your next office visit. Recognize signs and symptoms of STROKE:  F -  Face looks uneven  A -  Arms unable to move or move evenly  S -  Speech slurred or non-existent  T -  Time-call 911 as soon as signs and symptoms begin-DO NOT go          back to bed or wait to see if you get better-TIME IS BRAIN. Warning Signs of HEART ATTACK   Call 911 if you have these symptoms:     Chest discomfort. Most heart attacks involve discomfort in the center of the chest that lasts more than a few minutes, or that goes away and comes back.  It can feel like uncomfortable pressure, squeezing, fullness, or pain.   Discomfort in other areas of the upper body. Symptoms can include pain or discomfort in one or both arms, the back, neck, jaw, or stomach.  Shortness of breath with or without chest discomfort.  Other signs may include breaking out in a cold sweat, nausea, or lightheadedness. Don't wait more than five minutes to call 911 - MINUTES MATTER! Fast action can save your life. Calling 911 is almost always the fastest way to get lifesaving treatment. Emergency Medical Services staff can begin treatment when they arrive -- up to an hour sooner than if someone gets to the hospital by car. Learning About Coronavirus [phone])  Coronavirus [phone]): Overview  What is coronavirus (COVID-19)? The coronavirus disease (COVID-19) is caused by a virus. It is an illness that was first found in [place], [place], in December 2019. It has since spread worldwide. The virus can cause fever, cough, and trouble breathing. In severe cases, it can cause pneumonia and make it hard to breathe without help. It can cause death. Coronaviruses are a large group of viruses. They cause the common cold. They also cause more serious illnesses like Middle East respiratory syndrome (MERS) and severe acute respiratory syndrome (SARS). COVID-19 is caused by a novel coronavirus. That means it's a new type that has not been seen in people before. This virus spreads person-to-person through droplets from coughing and sneezing. It can also spread when you are close to someone who is infected. And it can spread when you touch something that has the virus on it, such as a doorknob or a tabletop. What can you do to protect yourself from coronavirus (COVID-19)? The best way to protect yourself from getting sick is to:  · Avoid areas where there is an outbreak. · Avoid contact with people who may be infected. · Wash your hands often with soap or alcohol-based hand sanitizers.   · Avoid crowds and try to stay at least 6 feet away from other people. · Wash your hands often, especially after you cough or sneeze. Use soap and water, and scrub for at least 20 seconds. If soap and water aren't available, use an alcohol-based hand . · Avoid touching your mouth, nose, and eyes. What can you do to avoid spreading the virus to others? To help avoid spreading the virus to others:  · Cover your mouth with a tissue when you cough or sneeze. Then throw the tissue in the trash. · Use a disinfectant to clean things that you touch often. · Stay home if you are sick or have been exposed to the virus. Don't go to school, work, or public areas. And don't use public transportation. · If you are sick:  ? Leave your home only if you need to get medical care. But call the doctor's office first so they know you're coming. And wear a face mask, if you have one.  ? If you have a face mask, wear it whenever you're around other people. It can help stop the spread of the virus when you cough or sneeze. ? Clean and disinfect your home every day. Use household  and disinfectant wipes or sprays. Take special care to clean things that you grab with your hands. These include doorknobs, remote controls, phones, and handles on your refrigerator and microwave. And don't forget countertops, tabletops, bathrooms, and computer keyboards. When to call for help  Call 911 anytime you think you may need emergency care. For example, call if:  · You have severe trouble breathing. (You can't talk at all.)  · You have constant chest pain or pressure. · You are severely dizzy or lightheaded. · You are confused or can't think clearly. · Your face and lips have a blue color. · You pass out (lose consciousness) or are very hard to wake up. Call your doctor now if you develop symptoms such as:  · Shortness of breath. · Fever. · Cough. If you need to get care, call ahead to the doctor's office for instructions before you go.  Make sure you wear a face mask, if you have one, to prevent exposing other people to the virus. Where can you get the latest information? The following health organizations are tracking and studying this virus. Their websites contain the most up-to-date information. Neil Qureshi also learn what to do if you think you may have been exposed to the virus. · U.S. Centers for Disease Control and Prevention (CDC): The CDC provides updated news about the disease and travel advice. The website also tells you how to prevent the spread of infection. www.cdc.gov  · World Health Organization [facility]): WHO offers information about the virus outbreaks. WHO also has travel advice. www.who.int  Current as of: April 1, 2020               Content Version: 12.4  © 2006-2020 [employer]. Care instructions adapted under license by your healthcare professional. If you have questions about a medical condition or this instruction, always ask your healthcare professional. [address] any warranty or liability for your use of this information. The discharge information has been reviewed with the {PATIENT PARENT GUARDIAN:01143}. Any questions and concerns from the {PATIENT PARENT GUARDIAN:39454} have been addressed. The {PATIENT PARENT GUARDIAN:77135} verbalized understanding.         CONTENTS FOUND IN YOUR DISCHARGE ENVELOPE:  [x]     Surgeon and Hospital Discharge Instructions  [x]     [facility] Care Provider Card  [x]     Medication & Side Effect Guide            (your newly prescribed medications have been marked/highlighted showing the most common side effects from   the classes of drugs on your prescriptions)  [x]     Medication Prescription(s) x *** ( [x] These have been sent electronically to your pharmacy by your surgeon,   - OR -       your surgeon has already provided these to you during a previous/pre-op office visit)      The following personal items collected during your admission are returned to you:   Dental Appliance: Dental Appliances: None  Vision: Visual Aid: Glasses  Hearing Aid:    Jewelry: Jewelry: None  Clothing: Clothing: Footwear,Pants,Shirt,Socks,Undergarments  Other Valuables:  Other Valuables: Eyeglasses  Valuables sent to safe:

## 2022-02-11 NOTE — H&P
Orthopedic Admission History and Physical        NAME: Cesilia Lujan       :  1945       MRN:  [id number]      Subjective:     Patient is a 78 y.o. male who presents with history of right carpal tunnel syndrome. Presents today for surgical treatment. There are no problems to display for this patient. Past Medical History:   Diagnosis Date    Arrhythmia     had afib during covid was cardioverted and then ablation    Arthritis     knees, hands, shoulders    COVID-19 2020    Heart failure ([address]     Hypertension     Sleep apnea     bipap    Thyroid disease     hypothyroidism      Past Surgical History:   Procedure Laterality Date    HX AFIB ABLATION  2021    HX COLONOSCOPY      HX HERNIA REPAIR      umbilical hernoa repair 20 yrs ago    HX KNEE ARTHROSCOPY Left     HX SHOULDER ARTHROSCOPY Right     RCR      Prior to Admission medications    Medication Sig Start Date End Date Taking? Authorizing Provider   silodosin (RAPAFLO) 8 mg capsule Take 8 mg by mouth daily (with breakfast). Yes Provider, Historical   sacubitriL-valsartan (Entresto) 24-26 mg tablet Take 0.5 Tablets by mouth two (2) times a day. Yes Provider, Historical   multivitamin (ONE A DAY) tablet Take 1 Tab by mouth daily. Yes Provider, Historical   ascorbic acid, vitamin C, (Vitamin C) 500 mg tablet Take 500 mg by mouth Every morning. Yes Provider, Historical   omega-3s-dha-epa-fish oil (Fish Oil) 350-600 mg cap Take 600 mg by mouth. Yes Provider, Historical   magnesium oxide (MAG-OX) 400 mg tablet Take 400 mg by mouth daily. Yes Provider, Historical   levothyroxine (Synthroid) 88 mcg tablet Take 88 mcg by mouth Daily (before breakfast). Yes Provider, Historical   cholecalciferol, vitamin D3, (Vitamin D3) 50 mcg (2,000 unit) tab Take 2,000 Units by mouth Every morning. Yes Provider, Historical   rivaroxaban (Xarelto) 20 mg tab tablet Take 20 mg by mouth daily (with dinner).    Yes Provider, Historical     Current Facility-Administered Medications   Medication Dose Route Frequency    lidocaine (PF) (XYLOCAINE) 10 mg/mL (1 %) injection 0.1 mL  0.1 mL SubCUTAneous PRN    lactated Ringers infusion  125 mL/hr IntraVENous CONTINUOUS    sodium chloride (NS) flush 5-40 mL  5-40 mL IntraVENous Q8H    sodium chloride (NS) flush 5-40 mL  5-40 mL IntraVENous PRN    naloxone (NARCAN) injection 0.04 mg  0.04 mg IntraVENous Multiple    flumazeniL (ROMAZICON) 0.1 mg/mL injection 0.2 mg  0.2 mg IntraVENous Multiple    ceFAZolin (ANCEF) 3 g in 0.9%  ml IVPB  3 g IntraVENous ONCE      Allergies   Allergen Reactions    Lipitor [Atorvastatin] Other (comments)     Causes neuropathy    Niaspan [Niacin] Other (comments)     Causes neuropathy     Zetia [Ezetimibe] Other (comments)     Causes neuropathy and sciatic nerve issues      Social History     Tobacco Use    Smoking status: Former Smoker    Smokeless tobacco: Never Used    Tobacco comment: QUIT 44 YRS AGO   Substance Use Topics    Alcohol use: Yes     Alcohol/week: 1.0 - 2.0 standard drink     Types: 1 - 2 Cans of beer per week      History reviewed. No pertinent family history. Review of Systems  A comprehensive review of systems was negative except for that written in the HPI. Objective:     No data found. No data recorded. Physical Exam:  General appearance: alert, cooperative, no distress, appears stated age  Lungs: No use of accessory breathing muscles. Breathing unlabored. Cardiac: Regular rate. Abdomen: soft, non-tender, non-distended  Extremities: As per prior exam.     Labs: No results found for this or any previous visit (from the past 24 hour(s)). Assessment:   No medical contraindications to proceeding with planned surgery. Please see initial office note for full discussion of risks, benefits, and alternatives to surgery. There are no problems to display for this patient. Plan:   Proceed with surgery  Pt. stable  Pt.  NPO x meds

## 2022-02-11 NOTE — BRIEF OP NOTE
Brief Postoperative Note    Patient: Samara Urena  YOB: 1945  MRN: [id number]    Date of Procedure: 2/11/2022     Pre-Op Diagnosis: RIGHT CARPAL TUNNEL SYNDROME    Post-Op Diagnosis: Same as preoperative diagnosis. Procedure(s):  RIGHT ENDOSCOPIC CARPAL TUNNEL RELEASE (ANES REGIONAL WITH MAC)    Surgeon(s):  Emily Rodriguez MD    Surgical Assistant: Physician Assistant: Marian Tomas PA-C    Anesthesia: MAC     Estimated Blood Loss (mL): Minimal    Complications: None    Specimens: * No specimens in log *     Implants: * No implants in log *    Drains: * No LDAs found *    Findings: As above.      Electronically Signed by Aydin Jacinto PA-C on 2/11/2022 at 2:01 PM

## 2022-02-11 NOTE — OP NOTES
Operative Report    Preoperative Diagnosis:  Right carpal tunnel syndrome    Postoperative Diagnosis:  Same     Procedure(s):  Endoscopic right carpal tunnel release    Surgeon: Pacheco Nguyen MD     Assistant: Breanne Ríos PA-C    Explanation of necessity of assistant: An assistant was necessary for this case with assistance in retraction and positioning to achieve appropriate exposure during the case    Anesthesia:  Regional    Estimated Blood Loss:  Minimal    Specimens:  None     Findings:  Synovitis of the carpal canal     Complications:  None    Implants:  None    Indication for procedure: Rolly Vance is a 78 y.o. male who presented with right carpal tunnel syndrome which was refractory to non operative treatment. We discussed appropriate expectations postoperatively. We also discussed risks of surgery including risk of bleeding, infection, damage to surrounding tendons, ligaments, nerves and blood vessels, which may cause permanent sensory loss or weakness, persistent pain and stiffness,  persistent numbness, need for further surgery, and anesthesia risks. The patient understands further that there can be no guarantees made regarding the outcomes of surgery. Informed consent was signed. Description of Procedure: The patient was seen and identified the pre anesthesia care unit. The operative site was marked. Preoperative questions were invited and answered. The patient was evaluated by the anesthesia team a brachial plexus block was placed. Patient was then brought to the operative suite. The patient was then prepped and draped in usual fashion. A time-out was entertained confirming the appropriate site, side, procedure. Then, an Esmarch bandage was used to exsanguinate the limb, and a well-padded tourniquet was inflated to 250 mmHg. Next, a transverse incision was made over the right volar distal forearm over the ulnar axis of the pulmonary is longus tendon.   This was identified and retracted radially. The underlying fascia was exposed. Then, this was opened and an L-shaped flap was created and retracted distally. Then, the synovial elevator was used on the undersurface of the transverse carpal ligament. Next, the the hamate finders were used. The endoscopic sound was then introduced. At this point, the endoscope was introduced. The distal extent of the transverse carpal ligament was identified with direct visualization. The distal fat pad was balloted to assist with identifying this precisely. Excellent visualization was obtained. Next, the distal third of the transverse carpal ligament was transected with the endoscopic blade. .  After full-thickness transection of this portion of ligament was confirmed, the remaining ligament was transected in full-thickness with endoscopic blade. Images were taken confirming full division of the ligament. The distal palmar fascia was divided as the endoscope was withdrawn. Next, the fascial flap was excised. Then, 2 cm of the proximal forearm fascial was divided using tenotomy scissors. The wound was irrigated. Subcuticular closure was done with 5-0 Monocryl. Dermabond was applied. Sterile dressing was applied. The patient was then allowed to emerge from anesthesia, and was brought to the PACU uneventfully. Postoperative plan:  Return to clinic in two weeks for wound check.

## 2023-05-08 RX ORDER — SPIRONOLACTONE 25 MG/1
25 TABLET ORAL DAILY
COMMUNITY

## 2023-05-09 ENCOUNTER — ANESTHESIA (OUTPATIENT)
Facility: HOSPITAL | Age: 78
End: 2023-05-09
Payer: MEDICARE

## 2023-05-09 ENCOUNTER — HOSPITAL ENCOUNTER (OUTPATIENT)
Facility: HOSPITAL | Age: 78
Setting detail: OUTPATIENT SURGERY
Discharge: HOME | End: 2023-05-09
Attending: INTERNAL MEDICINE | Admitting: INTERNAL MEDICINE
Payer: MEDICARE

## 2023-05-09 ENCOUNTER — ANESTHESIA EVENT (OUTPATIENT)
Facility: HOSPITAL | Age: 78
End: 2023-05-09
Payer: MEDICARE

## 2023-05-09 VITALS
RESPIRATION RATE: 21 BRPM | TEMPERATURE: 97.6 F | HEIGHT: 75 IN | WEIGHT: 277.34 LBS | SYSTOLIC BLOOD PRESSURE: 125 MMHG | BODY MASS INDEX: 34.48 KG/M2 | HEART RATE: 46 BPM | OXYGEN SATURATION: 97 % | DIASTOLIC BLOOD PRESSURE: 58 MMHG

## 2023-05-09 PROCEDURE — 7100000010 HC PHASE II RECOVERY - FIRST 15 MIN: Performed by: INTERNAL MEDICINE

## 2023-05-09 PROCEDURE — 7100000011 HC PHASE II RECOVERY - ADDTL 15 MIN: Performed by: INTERNAL MEDICINE

## 2023-05-09 PROCEDURE — 88305 TISSUE EXAM BY PATHOLOGIST: CPT

## 2023-05-09 PROCEDURE — 2580000003 HC RX 258: Performed by: INTERNAL MEDICINE

## 2023-05-09 PROCEDURE — 3600007512: Performed by: INTERNAL MEDICINE

## 2023-05-09 PROCEDURE — 3600007502: Performed by: INTERNAL MEDICINE

## 2023-05-09 PROCEDURE — 6370000000 HC RX 637 (ALT 250 FOR IP): Performed by: INTERNAL MEDICINE

## 2023-05-09 PROCEDURE — 3700000000 HC ANESTHESIA ATTENDED CARE: Performed by: INTERNAL MEDICINE

## 2023-05-09 PROCEDURE — 2500000003 HC RX 250 WO HCPCS: Performed by: NURSE ANESTHETIST, CERTIFIED REGISTERED

## 2023-05-09 PROCEDURE — 2709999900 HC NON-CHARGEABLE SUPPLY: Performed by: INTERNAL MEDICINE

## 2023-05-09 PROCEDURE — 3700000001 HC ADD 15 MINUTES (ANESTHESIA): Performed by: INTERNAL MEDICINE

## 2023-05-09 RX ORDER — SIMETHICONE 20 MG/.3ML
EMULSION ORAL PRN
Status: DISCONTINUED | OUTPATIENT
Start: 2023-05-09 | End: 2023-05-09 | Stop reason: ALTCHOICE

## 2023-05-09 RX ORDER — SODIUM CHLORIDE 0.9 % (FLUSH) 0.9 %
5-40 SYRINGE (ML) INJECTION EVERY 12 HOURS SCHEDULED
Status: DISCONTINUED | OUTPATIENT
Start: 2023-05-09 | End: 2023-05-09 | Stop reason: HOSPADM

## 2023-05-09 RX ORDER — SODIUM CHLORIDE 9 MG/ML
25 INJECTION, SOLUTION INTRAVENOUS PRN
Status: DISCONTINUED | OUTPATIENT
Start: 2023-05-09 | End: 2023-05-09 | Stop reason: HOSPADM

## 2023-05-09 RX ORDER — GLYCOPYRROLATE 0.2 MG/ML
INJECTION INTRAMUSCULAR; INTRAVENOUS PRN
Status: DISCONTINUED | OUTPATIENT
Start: 2023-05-09 | End: 2023-05-09 | Stop reason: SDUPTHER

## 2023-05-09 RX ORDER — SODIUM CHLORIDE 0.9 % (FLUSH) 0.9 %
5-40 SYRINGE (ML) INJECTION PRN
Status: DISCONTINUED | OUTPATIENT
Start: 2023-05-09 | End: 2023-05-09 | Stop reason: HOSPADM

## 2023-05-09 RX ADMIN — SODIUM CHLORIDE: 9 INJECTION, SOLUTION INTRAVENOUS at 14:37

## 2023-05-09 RX ADMIN — GLYCOPYRROLATE 0.2 MG: 0.2 INJECTION INTRAMUSCULAR; INTRAVENOUS at 14:52

## 2023-05-09 ASSESSMENT — PAIN - FUNCTIONAL ASSESSMENT: PAIN_FUNCTIONAL_ASSESSMENT: NONE - DENIES PAIN

## 2023-05-09 NOTE — PERIOP NOTE
Received recovery report from anesthesia team, see anesthesia note. Abdomen remains soft and non-tender post-procedure. Pt has no complaints at this time and tolerated procedure well. Endoscope was pre-cleaned at the bedside by [facility] immediately following procedure. Post recovery report given to [facility].

## 2023-05-09 NOTE — H&P
Frances Badillo M.D.  [phone]    History and Physical       NAME:  Maty Garza   :   1945   MRN:   [id number]       Consult Date: 2023 2:41 PM    Chief Complaint:  Colon cancer screening    History of Present Illness:  Patient is a 78 y.o. who is seen for colon cancer screening. Denies any ongoing GI complaints. The patient is a 78year old male who presents for a screening colonscopy. Note for \"Screening Colonoscopy\": Patient is a 77 yo male who presents today for  Last colon 2018. Due to repeat this spring. He denies a change in bowel habit. He has a BM once a day. No blood or black/tarry stool. Weight is stable. No abdominal pain. His grandmother had colon cancer. No upper Gi sxs. He drinks a beer; 1-2 beers a week. No tobacco or drug use. A. Fib; controlled. On xarelto. He is followed by Dr. Estephania West. He is not a diabetic. No hx of CAD, CVA, MI. He does have CHF. He denies CP/SOB. PMH:  Past Medical History:   Diagnosis Date    Arrhythmia     had afib during covid was cardioverted and then ablation    Arthritis     knees, hands, shoulders    COVID-19 2020    Family history of colon cancer     Paternal grandmother    Heart failure ([facility] [address]     History of colon polyps     Hypertension     Sleep apnea     bipap    Thyroid disease     hypothyroidism       PSH:  Past Surgical History:   Procedure Laterality Date    ABLATION OF DYSRHYTHMIC FOCUS  2021    COLONOSCOPY      HERNIA REPAIR      umbilical hernoa repair 20 yrs ago    KNEE ARTHROSCOPY Left     SHOULDER ARTHROSCOPY Right     RCR       Allergies:   Allergies   Allergen Reactions    Atorvastatin Other (See Comments)     Causes neuropathy    Ezetimibe Other (See Comments)     Causes neuropathy and sciatic nerve issues    Niacin Other (See Comments)     Causes neuropathy        Home Medications:  Prior to Admission Medications   Prescriptions Last Dose Informant Patient

## 2023-05-09 NOTE — PROGRESS NOTES
Received report from Raf CINTRON at 0655, and assumed patient care. Patient is ventilated and unable to speak, but follows. Patient is currently in bed, and has no other needs at this time.    Shasta Kourtney  1945  [id number]    Situation:  Verbal report received from: Simon Solorio RN   Procedure: Procedure(s):  COLONOSCOPY  COLONOSCOPY POLYPECTOMY SNARE/COLD BIOPSY    Background:    Preoperative diagnosis: Personal history of colonic polyps [Z86.010]  Postoperative diagnosis: * No post-op diagnosis entered *    :  Dr. Tolu Randle  Assistant(s): Circulator: Judge Haritha RN  Endoscopy Technician: Saundra Casey    Specimens:   ID Type Source Tests Collected by Time Destination   A : Descending Colon Polyp Tissue Colon-Descending SURGICAL PATHOLOGY Anthony Leyva MD 5/9/2023 1504    B : Rectal Polyps Tissue Rectum SURGICAL PATHOLOGY Anthony Leyva MD 5/9/2023 1506      H. Pylori  No    Assessment:    Anesthesia gave intra-procedure sedation and medications, see anesthesia flow sheet No    Intravenous fluids: NS@ KVO     Vital signs stable     Abdominal assessment: round and soft     Recommendation:  Discharge patient per MD order.   Return to floor  Family or Friend   Permission to share finding with family or friend Yes

## 2023-05-09 NOTE — ANESTHESIA POSTPROCEDURE EVALUATION
Department of Anesthesiology  Postprocedure Note    Patient: Charley Falk  MRN: [id number]  YOB: 1945  Date of evaluation: 5/9/2023      Procedure Summary     Date: 05/09/23 Room / Location: [facility] ENDO  / [facility] ENDOSCOPY    Anesthesia Start: 1437 Anesthesia Stop: 3336    Procedures:       COLONOSCOPY (Lower GI Region)      COLONOSCOPY POLYPECTOMY SNARE/COLD BIOPSY (Lower GI Region) Diagnosis:       Personal history of colonic polyps      (Personal history of colonic polyps [Z86.010])    Surgeons: Surjit Rodriguez MD Responsible Provider: Denzel Eli MD    Anesthesia Type: MAC ASA Status: 3          Anesthesia Type: No value filed.     Batsheva Phase I: Batsehva Score: 10    Batsheva Phase II: Batsheva Score: 10      Anesthesia Post Evaluation    Patient location during evaluation: PACU  Patient participation: complete - patient participated  Level of consciousness: awake  Airway patency: patent  Complications: no  Cardiovascular status: hemodynamically stable  Respiratory status: acceptable  Hydration status: stable

## 2023-05-09 NOTE — OP NOTE
[address] [place] [address]. Lam Galvez M.D.  [phone]            2023          Colonoscopy Operative Report  Kesha Mac  :  1945  [facility] Medical Record Number:  [id number]      Indications:  Colon cancer screening; personal history of colon polyps; last took rivaroxaban over 48 hrs ago     :  Herrera Vasquez MD    Referring Provider: Chetna Shepherd MD    Sedation:  MAC    Pre-Procedural Exam:      Airway: clear,  No airway problems anticipated  Heart: RRR, without gallops or rubs  Lungs: clear bilaterally without wheezes, crackles, or rhonchi  Abdomen: soft, nontender, nondistended, bowel sounds present  Mental Status: awake, alert and oriented to person, place and time     Procedure Details:  After informed consent was obtained with all risks and benefits of procedure explained and preoperative exam completed, the patient was taken to the endoscopy suite and placed in the left lateral decubitus position. Upon sequential sedation as per above, a digital rectal exam was performed. The Olympus videocolonoscope  was inserted in the rectum and carefully advanced to the cecum, which was identified by the ileocecal valve and appendiceal orifice, terminal ileum. The quality of preparation was good. The colonoscope was slowly withdrawn with careful inspection and evaluation between folds. Retroflexion in the rectum was performed. The patient tolerated the procedure well. Findings:   Terminal Ileum: Normal  Cecum: Normal  Ascending Colon: Normal  Transverse Colon: Normal  Descending Colon: Normal aside from a sessile 1-2 mm polyp removed in 1 piece with cold forceps. Sigmoid: Normal aside from few scattered medium sized diverticulosis. Rectum:  Two 3-5mm semi-sessile polyps removed by cold snare and small non-bleeding internal hemorrhoids. Otherwise normal on antegrade and retroflexed views. Multiple pull throughs were done through the cecum and ascending colon.

## 2023-05-09 NOTE — ANESTHESIA PRE PROCEDURE
Department of Anesthesiology  Preprocedure Note       Name:  Narcisa Ty   Age:  78 y.o.  :  1945                                          MRN:  [id number]         Date:  2023      Surgeon: Xuan Perez):  Jaylon Wallace MD    Procedure: Procedure(s):  COLONOSCOPY    Medications prior to admission:   Prior to Admission medications    Medication Sig Start Date End Date Taking? Authorizing Provider   spironolactone (ALDACTONE) 25 MG tablet Take 1 tablet by mouth daily   Yes Historical Provider, MD   ascorbic acid (VITAMIN C) 500 MG tablet Take 1 tablet by mouth every morning    Ar Automatic Reconciliation   Cholecalciferol 50 MCG (2000) TABS Take 1 tablet by mouth every morning    Ar Automatic Reconciliation   levothyroxine (SYNTHROID) 88 MCG tablet Take 1 tablet by mouth every morning (before breakfast)    Ar Automatic Reconciliation   magnesium oxide (MAG-OX) 400 MG tablet Take 1 tablet by mouth daily    Ar Automatic Reconciliation   rivaroxaban (XARELTO) 20 MG TABS tablet Take 1 tablet by mouth Daily with supper    Ar Automatic Reconciliation   sacubitril-valsartan (ENTRESTO) 24-26 MG per tablet Take 0.5 tablets by mouth 2 times daily    Ar Automatic Reconciliation       Current medications:    No current facility-administered medications for this encounter. Allergies: Allergies   Allergen Reactions    Atorvastatin Other (See Comments)     Causes neuropathy    Ezetimibe Other (See Comments)     Causes neuropathy and sciatic nerve issues    Niacin Other (See Comments)     Causes neuropathy        Problem List:  There is no problem list on file for this patient.       Past Medical History:        Diagnosis Date    Arrhythmia     had afib during covid was cardioverted and then ablation    Arthritis     knees, hands, shoulders    COVID-19 2020    Family history of colon cancer     Paternal grandmother    Heart failure ([facility] [address]     History of colon polyps     Hypertension     Sleep

## 2023-05-09 NOTE — DISCHARGE INSTRUCTIONS
[facility]aleFabián Barrios M.D.  [phone]            COLON DISCHARGE INSTRUCTIONS       2023    Danuta Anderson  :  1945  [facility] Medical Record Number:  [id number]      COLONOSCOPY FINDINGS:  Your colonoscopy showed: 3 small colon polyps that were harmless in appearance were removed. Small internal hemorrhoids. Mild sigmoid diverticulosis. DISCOMFORT:  Redness at IV site- apply warm compress to area; if redness or soreness persist- contact your physician  There may be a slight amount of blood passed from the rectum  Gaseous discomfort- walking, belching will help relieve any discomfort  You may not operate a vehicle for 12 hours  You may not engage in an occupation involving machinery or appliances for rest of today  You may not drink alcoholic beverages for at least 12 hours  Avoid making any critical decisions for at least 24 hour  DIET:   High fiber diet. - however -  remember your colon is empty and a heavy meal will produce gas. Avoid these foods:  vegetables, fried / greasy foods, carbonated drinks for today     ACTIVITY:  You may resume your normal daily activities it is recommended that you spend the remainder of the day resting -  avoid any strenuous activity. CALL M.D. ANY SIGN OF:   Increasing pain, nausea, vomiting  Abdominal distension (swelling)  New increased bleeding (oral or rectal)  Fever (chills)  Pain in chest area  Bloody discharge from nose or mouth   Shortness of breath    Follow-up Instructions:   Call Dr. Nusrat Hyman if any questions or problems. Telephone # [phone]  Biopsy results will be available in about 14 days. If you have not heard about your results within 2-3 weeks, please call the office.     -Await pathology.   -High fiber diet.    -Resume normal medication(s) today aside from Xarelto which can be restarted tomorrow.  -No further colonoscopies given age.

## 2023-05-09 NOTE — PROGRESS NOTES
Endoscopy discharge instructions have been reviewed and given to patient. The patient verbalized understanding and acceptance of instructions. Dr. Jayro Hirsch discussed with patient procedure findings and next steps.

## 2023-05-18 ENCOUNTER — HOSPITAL ENCOUNTER (OUTPATIENT)
Facility: HOSPITAL | Age: 78
Discharge: HOME | End: 2023-05-18
Payer: COMMERCIAL

## 2023-05-18 VITALS
SYSTOLIC BLOOD PRESSURE: 148 MMHG | DIASTOLIC BLOOD PRESSURE: 79 MMHG | TEMPERATURE: 98 F | RESPIRATION RATE: 18 BRPM | HEART RATE: 52 BPM

## 2023-05-18 DIAGNOSIS — I83.019 VENOUS STASIS ULCER OF RIGHT LOWER EXTREMITY (HCC): ICD-10-CM

## 2023-05-18 DIAGNOSIS — L97.919 VENOUS STASIS ULCER OF RIGHT LOWER EXTREMITY (HCC): ICD-10-CM

## 2023-05-18 PROCEDURE — 99203 OFFICE O/P NEW LOW 30 MIN: CPT

## 2023-05-18 PROCEDURE — 11042 DBRDMT SUBQ TIS 1ST 20SQCM/<: CPT

## 2023-05-18 PROCEDURE — 99204 OFFICE O/P NEW MOD 45 MIN: CPT

## 2023-05-18 RX ORDER — LIDOCAINE 40 MG/G
CREAM TOPICAL ONCE
OUTPATIENT
Start: 2023-05-18 | End: 2023-05-18

## 2023-05-18 RX ORDER — LIDOCAINE HYDROCHLORIDE 20 MG/ML
JELLY TOPICAL ONCE
OUTPATIENT
Start: 2023-05-18 | End: 2023-05-18

## 2023-05-18 RX ORDER — SODIUM CHLOR/HYPOCHLOROUS ACID 0.033 %
SOLUTION, IRRIGATION IRRIGATION ONCE
OUTPATIENT
Start: 2023-05-18 | End: 2023-05-18

## 2023-05-18 RX ORDER — GENTAMICIN SULFATE 1 MG/G
OINTMENT TOPICAL ONCE
OUTPATIENT
Start: 2023-05-18 | End: 2023-05-18

## 2023-05-18 RX ORDER — LIDOCAINE HYDROCHLORIDE 40 MG/ML
SOLUTION TOPICAL ONCE
OUTPATIENT
Start: 2023-05-18 | End: 2023-05-18

## 2023-05-18 RX ORDER — BACITRACIN, NEOMYCIN, POLYMYXIN B 400; 3.5; 5 [USP'U]/G; MG/G; [USP'U]/G
OINTMENT TOPICAL ONCE
OUTPATIENT
Start: 2023-05-18 | End: 2023-05-18

## 2023-05-18 RX ORDER — BETAMETHASONE DIPROPIONATE 0.05 %
OINTMENT (GRAM) TOPICAL ONCE
OUTPATIENT
Start: 2023-05-18 | End: 2023-05-18

## 2023-05-18 RX ORDER — LIDOCAINE 50 MG/G
OINTMENT TOPICAL ONCE
OUTPATIENT
Start: 2023-05-18 | End: 2023-05-18

## 2023-05-18 RX ORDER — BACITRACIN ZINC AND POLYMYXIN B SULFATE 500; 1000 [USP'U]/G; [USP'U]/G
OINTMENT TOPICAL ONCE
OUTPATIENT
Start: 2023-05-18 | End: 2023-05-18

## 2023-05-18 RX ORDER — CLOBETASOL PROPIONATE 0.5 MG/G
OINTMENT TOPICAL ONCE
OUTPATIENT
Start: 2023-05-18 | End: 2023-05-18

## 2023-05-18 RX ORDER — GINSENG 100 MG
CAPSULE ORAL ONCE
OUTPATIENT
Start: 2023-05-18 | End: 2023-05-18

## 2023-05-18 ASSESSMENT — PAIN SCALES - GENERAL: PAINLEVEL_OUTOF10: 1

## 2023-05-18 ASSESSMENT — PAIN DESCRIPTION - LOCATION: LOCATION: LEG

## 2023-05-18 ASSESSMENT — PAIN DESCRIPTION - ORIENTATION: ORIENTATION: RIGHT

## 2023-05-18 NOTE — DISCHARGE INSTRUCTIONS
Discharge Instructions/Wound Orders  [place] [address] [facility], [address]  Telephone: [phone]    NAME:  Lizeth Cade  YOB: 1945  MEDICAL RECORD NUMBER:  [id number]  DATE:  May 18, 2023  Wound Care Orders:  Right lower extremity: Cleanse lower leg with baby shampoo, rinse and pat dry. Apply silver alginate to open areas, cover with gauze. Apply Unna boot. Change weekly in clinic. Activity:  [x] Activity as tolerated:  elevate leg while sitting   [] Remain off Work:       [] May return to full duty work:                                     [] Return to work with restrictions:  Dietary:  [x] Diet as tolerated      [] Diabetic Diet            [] Increase Protein: examples (Meat, cheese, eggs, greek yogurt, fish, nuts)          [] [address]  [] Other:  [] Dial a Dietician : Call [facility] at [phone] enter code [phone] when prompted. M-F 9am-5pm EST. Return Appointment:  [x] [facility] App[facility]ment: [id number] in 1 Week(s)  [] Ordered tests:    Electronically signed Flor Bush RN on 5/18/2023 at [facility]: Should you experience any significant changes in your wound(s) or have questions about your wound care, please contact the [address] [place] [address] at [address] [place] [address] 8:00 am - 4:30. If you need help with your wound outside these hours and cannot wait until we are again available, contact your PCP or go to the hospital emergency room. PLEASE NOTE: IF YOU ARE UNABLE TO OBTAIN WOUND SUPPLIES, CONTINUE TO USE THE SUPPLIES YOU HAVE AVAILABLE UNTIL YOU ARE ABLE TO REACH US. IT IS MOST IMPORTANT TO KEEP THE WOUND COVERED AT ALL TIMES.      Physician Signature:_______________________    Date: ___________ Time:  ____________

## 2023-05-18 NOTE — H&P
Patient presents to wound clinic for: Willy Camp is a 78 y.o. male with history of congestive heart failure who presents for right leg wound. Patient states he has had a right leg wound since December after scraping his leg on a log while working outside. Patient reports that it has gotten worse over the last month and his PCP got a culture at most recent visit. Patient was subsequently called in a prescription for Bactrim which he is currently taking. Patient reports minimal pain to the area. He has an appointment with a vascular surgeon next week for blood flow studies. Denies nausea, chills, vomiting, fever, sob or chest pain. Presents with unna boot dressing today. Pertinent Medical History:  Past Medical History:   Diagnosis Date    Arrhythmia     had afib during covid was cardioverted and then ablation    Arthritis     knees, hands, shoulders    COVID-19 09/2020    Family history of colon cancer     Paternal grandmother    Heart failure ([facility] [address]     History of colon polyps     Hypertension     Sleep apnea     bipap    Thyroid disease     hypothyroidism     Past Surgical History:   Procedure Laterality Date    ABLATION OF DYSRHYTHMIC FOCUS  01/06/2021    COLONOSCOPY      COLONOSCOPY N/A 5/9/2023    COLONOSCOPY performed by Kami Saenz MD at [address] N/A 5/9/2023    COLONOSCOPY POLYPECTOMY SNARE/COLD BIOPSY performed by Kami Saenz MD at [address]      umbilical hernoa repair 20 yrs ago    KNEE ARTHROSCOPY Left 2005    SHOULDER ARTHROSCOPY Right 2007    RCR     Prior to Admission medications    Medication Sig Start Date End Date Taking?  Authorizing Provider   spironolactone (ALDACTONE) 25 MG tablet Take 1 tablet by mouth daily    Historical Provider, MD   ascorbic acid (VITAMIN C) 500 MG tablet Take 1 tablet by mouth every morning    Ar Automatic Reconciliation   Cholecalciferol 50 MCG (2000 UT) TABS Take 1 tablet by mouth every morning    Ar Automatic

## 2023-05-18 NOTE — FLOWSHEET NOTE
05/18/23 1314   Right Leg Edema Point of Measurement   Heel to calf 38.5   Leg circumference 25 cm   Compression Therapy Unna boot   RLE Neurovascular Assessment   Capillary Refill Less than/Equal to 3 seconds   Color Red   Temperature Warm   RLE Sensation  Full sensation   R Pedal Pulse +2   LLE Neurovascular Assessment   Capillary Refill Less than/Equal to 3 seconds   Color Appropriate for Ethnicity   Temperature Warm   LLE Sensation  Full sensation   L Pedal Pulse +2   Pain Assessment   Pain Assessment 0-10   Pain Level 1   Pain Location Leg   Pain Orientation Right     BP (!) 148/79   Pulse 52   Temp 98 °F (36.7 °C) (Temporal)   Resp 18
05/18/23 1327   Wound 05/18/23 Leg Lower;Right   Date First Assessed: 05/18/23   Present on Hospital Admission: Yes  Wound Approximate Age at First Assessment (Weeks): 60 weeks  Location: Leg  Wound Location Orientation: Lower;Right   Wound Image     Dressing Status Intact; Old drainage noted   Wound Cleansed Soap and water   Wound Length (cm) 11 cm   Wound Width (cm) 5 cm   Wound Depth (cm) 0.1 cm   Wound Surface Area (cm^2) 55 cm^2   Wound Volume (cm^3) 5.5 cm^3   Wound Assessment Pink/red;Slough; Other (Comment)  (dried exudate)   Drainage Amount Small   Drainage Description Serosanguinous   Odor None   Naye-wound Assessment Blanchable erythema;Fragile   Margins Undefined edges   Wound Thickness Description not for Pressure Injury Full thickness     BP (!) 148/79   Pulse 52   Temp 98 °F (36.7 °C) (Temporal)   Resp 18
05/18/23 1401   Wound 05/18/23 Leg Lower;Right   Date First Assessed: 05/18/23   Present on Hospital Admission: Yes  Wound Approximate Age at First Assessment (Weeks): 60 weeks  Location: Leg  Wound Location Orientation: Lower;Right   Dressing/Treatment   (silver alginate, unna boot)
No

## 2023-05-25 ENCOUNTER — HOSPITAL ENCOUNTER (OUTPATIENT)
Facility: HOSPITAL | Age: 78
Discharge: HOME | End: 2023-05-25
Payer: MEDICARE

## 2023-05-25 VITALS
TEMPERATURE: 98 F | DIASTOLIC BLOOD PRESSURE: 73 MMHG | SYSTOLIC BLOOD PRESSURE: 130 MMHG | RESPIRATION RATE: 19 BRPM | HEART RATE: 55 BPM

## 2023-05-25 DIAGNOSIS — I83.019 VENOUS STASIS ULCER OF RIGHT LOWER EXTREMITY (HCC): Primary | ICD-10-CM

## 2023-05-25 DIAGNOSIS — I42.9 CARDIOMYOPATHY, UNSPECIFIED TYPE (HCC): ICD-10-CM

## 2023-05-25 DIAGNOSIS — L97.919 VENOUS STASIS ULCER OF RIGHT LOWER EXTREMITY (HCC): Primary | ICD-10-CM

## 2023-05-25 PROCEDURE — 99213 OFFICE O/P EST LOW 20 MIN: CPT

## 2023-05-25 PROCEDURE — 99213 OFFICE O/P EST LOW 20 MIN: CPT | Performed by: SURGERY

## 2023-05-25 RX ORDER — SULFAMETHOXAZOLE AND TRIMETHOPRIM 800; 160 MG/1; MG/1
1 TABLET ORAL 2 TIMES DAILY
COMMUNITY

## 2023-05-25 RX ORDER — SODIUM CHLOR/HYPOCHLOROUS ACID 0.033 %
SOLUTION, IRRIGATION IRRIGATION ONCE
OUTPATIENT
Start: 2023-05-25 | End: 2023-05-25

## 2023-05-25 RX ORDER — LIDOCAINE HYDROCHLORIDE 20 MG/ML
JELLY TOPICAL ONCE
OUTPATIENT
Start: 2023-05-25 | End: 2023-05-25

## 2023-05-25 RX ORDER — LIDOCAINE HYDROCHLORIDE 40 MG/ML
SOLUTION TOPICAL ONCE
OUTPATIENT
Start: 2023-05-25 | End: 2023-05-25

## 2023-05-25 RX ORDER — BACITRACIN ZINC AND POLYMYXIN B SULFATE 500; 1000 [USP'U]/G; [USP'U]/G
OINTMENT TOPICAL ONCE
OUTPATIENT
Start: 2023-05-25 | End: 2023-05-25

## 2023-05-25 RX ORDER — GINSENG 100 MG
CAPSULE ORAL ONCE
OUTPATIENT
Start: 2023-05-25 | End: 2023-05-25

## 2023-05-25 RX ORDER — BETAMETHASONE DIPROPIONATE 0.05 %
OINTMENT (GRAM) TOPICAL ONCE
OUTPATIENT
Start: 2023-05-25 | End: 2023-05-25

## 2023-05-25 RX ORDER — BACITRACIN, NEOMYCIN, POLYMYXIN B 400; 3.5; 5 [USP'U]/G; MG/G; [USP'U]/G
OINTMENT TOPICAL ONCE
OUTPATIENT
Start: 2023-05-25 | End: 2023-05-25

## 2023-05-25 RX ORDER — GENTAMICIN SULFATE 1 MG/G
OINTMENT TOPICAL ONCE
OUTPATIENT
Start: 2023-05-25 | End: 2023-05-25

## 2023-05-25 RX ORDER — LIDOCAINE 40 MG/G
CREAM TOPICAL ONCE
OUTPATIENT
Start: 2023-05-25 | End: 2023-05-25

## 2023-05-25 RX ORDER — LIDOCAINE 50 MG/G
OINTMENT TOPICAL ONCE
OUTPATIENT
Start: 2023-05-25 | End: 2023-05-25

## 2023-05-25 RX ORDER — CLOBETASOL PROPIONATE 0.5 MG/G
OINTMENT TOPICAL ONCE
OUTPATIENT
Start: 2023-05-25 | End: 2023-05-25

## 2023-05-25 ASSESSMENT — ENCOUNTER SYMPTOMS: RESPIRATORY NEGATIVE: 1

## 2023-05-25 NOTE — DISCHARGE INSTRUCTIONS
Discharge Instructions for          [place] [address] [facility], [address]  Phone: [phone] Fax: [phone]    NAME:  Lalitha Sinclair  YOB: 1945  MEDICAL RECORD NUMBER:  [id number]  DATE:  May 25, 2023  WOUND CARE ORDERS:  Right lower extremity: Cleanse lower leg with baby shampoo, rinse and pat dry. Place gauze to cover wound at home if weeping. Place double layer tubigrip, size F, daily for compression. Activity:  [x] Activity as tolerated: Elevate leg(s) above the level of the heart when sitting and a void prolonged standing in one place. Do no get dressing/wrap wet. [] Remain off Work:       [] May return to full duty work:                                     [] Return to work with restrictions:  Dietary:  [x] Diet as tolerated      [] Diabetic Diet            [x] Increase Protein: examples (Meat, cheese, eggs, greek yogurt, fish, nuts)          [x] [address]  Return Appointment:  [x] Return Appointment: With Dr. Toñito Maria in 2 weeks. [] Nurse Visit : *** days  [] Ordered tests:    Electronically signed Sanju Galindo RN on 5/25/2023 at [address]: Should you experience any significant changes in your wound(s) or have questions about your wound care, please contact the [address] [place] [address] at [address] [place] [address] 8:00 am - 4:30. If you need help with your wound outside these hours and cannot wait until we are again available, contact your PCP or go to the hospital emergency room. PLEASE NOTE: IF YOU ARE UNABLE TO OBTAIN WOUND SUPPLIES, CONTINUE TO USE THE SUPPLIES YOU HAVE AVAILABLE UNTIL YOU ARE ABLE TO REACH US. IT IS MOST IMPORTANT TO KEEP THE WOUND COVERED AT ALL TIMES.      Physician Signature:_______________________    Date: ___________ Time:  ____________

## 2023-05-25 NOTE — FLOWSHEET NOTE
05/25/23 1353   Wound 05/18/23 Leg Lower;Right   Date First Assessed: 05/18/23   Present on Hospital Admission: Yes  Wound Approximate Age at First Assessment (Weeks): 60 weeks  Location: Leg  Wound Location Orientation: Lower;Right   Wound Image    Dressing Status Other (Comment)  (open to air/ patient removed on saturday due to it getting wet and left it open to air.)   Wound Cleansed Cleansed with saline   Wound Length (cm) 7.5 cm   Wound Width (cm) 2 cm   Wound Depth (cm) 0.1 cm   Wound Surface Area (cm^2) 15 cm^2   Change in Wound Size % (l*w) 72.73   Wound Volume (cm^3) 1.5 cm^3   Wound Healing % 73   Wound Assessment Pink/red  (partially scabbed.)   Drainage Amount None   Odor None   Naye-wound Assessment Blanchable erythema;Dry/flaky   Margins Undefined edges   Wound Thickness Description not for Pressure Injury Full thickness     /73   Pulse 55   Temp 98 °F (36.7 °C) (Temporal)   Resp 19

## 2023-05-25 NOTE — PROGRESS NOTES
WOUND CARE PROGRESS       Subjective:     Patient  Patient presents to wound clinic for: Costa Torres is a 78 y.o. male with history of congestive heart failure who presents for right leg wound. Patient states he has had a right leg wound since December after scraping his leg on a log while working outside. Patient reports that it has gotten worse over the last month and his PCP got a culture at most recent visit. Patient was subsequently called in a prescription for Bactrim which he is currently taking. Patient reports minimal pain to the area. He has an appointment with a vascular surgeon next week for blood flow studies. Denies nausea, chills, vomiting, fever, sob or chest pain. Presents with unna boot dressing today. Patient has not noted any further open wounds or weeping, apparently the Unna boot was removed at home by the patient several days prior to this visit on May 25, 2023 because he fell and apparently the dressing got wet. No other injuries or concerns. Patient's last LVEF was 35% by his understanding on echo, previously 15% on cardiac catheterization. 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 Review of Systems   Constitutional:         See HPI no significant leg pain and no significant lower extremity swelling or edema. Respiratory: Negative. Cardiovascular: Negative. All other systems reviewed and are negative. Objective:     Blood pressure 130/73, pulse 55, temperature 98 °F (36.7 °C), temperature source Temporal, resp. rate 19. Temp (24hrs), Av °F (36.7 °C), Min:98 °F (36.7 °C), Max:98 °F (36.7 °C)      Physical Exam  Vitals and nursing note reviewed. Exam conducted with a chaperone present. Constitutional:       General: He is not in acute distress. Appearance: Normal appearance. He is obese.  He is not

## 2023-06-08 ENCOUNTER — HOSPITAL ENCOUNTER (OUTPATIENT)
Facility: HOSPITAL | Age: 78
Discharge: HOME | End: 2023-06-08
Payer: MEDICARE

## 2023-06-08 VITALS
RESPIRATION RATE: 18 BRPM | TEMPERATURE: 97 F | HEART RATE: 76 BPM | DIASTOLIC BLOOD PRESSURE: 73 MMHG | SYSTOLIC BLOOD PRESSURE: 137 MMHG

## 2023-06-08 DIAGNOSIS — I83.019 VENOUS STASIS ULCER OF RIGHT LOWER EXTREMITY (HCC): Primary | ICD-10-CM

## 2023-06-08 DIAGNOSIS — L97.919 VENOUS STASIS ULCER OF RIGHT LOWER EXTREMITY (HCC): Primary | ICD-10-CM

## 2023-06-08 PROCEDURE — 11042 DBRDMT SUBQ TIS 1ST 20SQCM/<: CPT

## 2023-06-08 RX ORDER — LIDOCAINE HYDROCHLORIDE 20 MG/ML
JELLY TOPICAL ONCE
OUTPATIENT
Start: 2023-06-08 | End: 2023-06-08

## 2023-06-08 RX ORDER — GINSENG 100 MG
CAPSULE ORAL ONCE
OUTPATIENT
Start: 2023-06-08 | End: 2023-06-08

## 2023-06-08 RX ORDER — LIDOCAINE 50 MG/G
OINTMENT TOPICAL ONCE
OUTPATIENT
Start: 2023-06-08 | End: 2023-06-08

## 2023-06-08 RX ORDER — CLOBETASOL PROPIONATE 0.5 MG/G
OINTMENT TOPICAL ONCE
OUTPATIENT
Start: 2023-06-08 | End: 2023-06-08

## 2023-06-08 RX ORDER — GENTAMICIN SULFATE 1 MG/G
OINTMENT TOPICAL ONCE
OUTPATIENT
Start: 2023-06-08 | End: 2023-06-08

## 2023-06-08 RX ORDER — BETAMETHASONE DIPROPIONATE 0.05 %
OINTMENT (GRAM) TOPICAL ONCE
OUTPATIENT
Start: 2023-06-08 | End: 2023-06-08

## 2023-06-08 RX ORDER — IBUPROFEN 200 MG
TABLET ORAL ONCE
OUTPATIENT
Start: 2023-06-08 | End: 2023-06-08

## 2023-06-08 RX ORDER — LIDOCAINE 40 MG/G
CREAM TOPICAL ONCE
OUTPATIENT
Start: 2023-06-08 | End: 2023-06-08

## 2023-06-08 RX ORDER — SODIUM CHLOR/HYPOCHLOROUS ACID 0.033 %
SOLUTION, IRRIGATION IRRIGATION ONCE
OUTPATIENT
Start: 2023-06-08 | End: 2023-06-08

## 2023-06-08 RX ORDER — LIDOCAINE HYDROCHLORIDE 40 MG/ML
SOLUTION TOPICAL ONCE
OUTPATIENT
Start: 2023-06-08 | End: 2023-06-08

## 2023-06-08 RX ORDER — BACITRACIN ZINC AND POLYMYXIN B SULFATE 500; 1000 [USP'U]/G; [USP'U]/G
OINTMENT TOPICAL ONCE
OUTPATIENT
Start: 2023-06-08 | End: 2023-06-08

## 2023-06-08 NOTE — DISCHARGE INSTRUCTIONS
Discharge Instructions for          [place] [address] 1200 [facility], [address]  Phone: [phone] Fax: [phone]    NAME:  Sandee Leos  YOB: 1945  MEDICAL RECORD NUMBER:  [id number]  DATE:  June 8, 2023  WOUND CARE ORDERS:  Right lower extremity: Cleanse lower leg with baby shampoo, rinse and pat dry. Place silver alginate to wound bed. Cover with gauze, roll gauze and secure with tape. Place double layer tubigrip, size F, daily for compression. Activity:  [x] Activity as tolerated: Elevate leg(s) above the level of the heart when sitting and a void prolonged standing in one place. Do no get dressing/wrap wet. [] Remain off Work:       [] May return to full duty work:                                     [] Return to work with restrictions:  Dietary:  [x] Diet as tolerated      [] Diabetic Diet            [] Increase Protein: examples (Meat, cheese, eggs, greek yogurt, fish, nuts)          [x] [address]     Return Appointment:  [x] Return Appointment: With Dr. Claudetta Fallow in 2 Weeks    [] Ordered tests:    Electronically signed Varun Diaz RN on 6/8/2023 at [address] [place] [address]: Should you experience any significant changes in your wound(s) or have questions about your wound care, please contact the [address] [place] [address] at [address] [place] [address] 8:00 am - 4:30. If you need help with your wound outside these hours and cannot wait until we are again available, contact your PCP or go to the hospital emergency room. PLEASE NOTE: IF YOU ARE UNABLE TO OBTAIN WOUND SUPPLIES, CONTINUE TO USE THE SUPPLIES YOU HAVE AVAILABLE UNTIL YOU ARE ABLE TO REACH US. IT IS MOST IMPORTANT TO KEEP THE WOUND COVERED AT ALL TIMES.      Physician Signature:_______________________    Date: ___________ Time:  ____________

## 2023-06-08 NOTE — PROGRESS NOTES
Undefined edges   Wound Thickness Description not for Pressure Injury Full thickness      /73   Pulse 76   Temp 97 °F (36.1 °C) (Temporal)   Resp 18     Debridement:   Debridement Wound Care        Problem List Items Addressed This Visit          Other    Venous stasis ulcer of right lower extremity ([address] - Primary    Relevant Orders    Initiate Outpatient Wound Care Protocol    Initiate Outpatient Wound Care Protocol       Procedure Note  Indications:  Based on my examination of this patient's wound(s)/ulcer(s) today, debridement is  required to promote healing and evaluate the wound base. Performed by:  Mykel Ward DPM    Consent obtained: Yes    Time out taken: Yes    Debridement: excisional    Using curette and #15 blade scalpel the wound(s)/ulcer(s) was/were sharply debrided down through and including the removal of    epidermis, dermis, and subcutaneous tissue    Devitalized Tissue Debrided: fibrin, slough, and callus    Pre Debridement Measurements:  Are located in the [place]  Documentation Flow Sheet    Non-Pressure ulcer, fat layer exposed    Wound/Ulcer #: 1    Post Debridement Measurements:  Wound/Ulcer Descriptions are Pre Debridement except measurements:    Wound Care Documentation:  Wound 05/18/23 Leg Lower;Right (Active)   Wound Image   06/08/23 1327   Dressing Status New dressing applied 06/08/23 1357   Wound Cleansed Irrigated with saline 06/08/23 1327   Dressing/Treatment Alginate with Ag;Gauze dressing/dressing sponge;Roll gauze 06/08/23 1357   Offloading for Diabetic Foot Ulcers Offloading not required 06/08/23 1327   Wound Length (cm) 4 cm 06/08/23 1327   Wound Width (cm) 2 cm 06/08/23 1327   Wound Depth (cm) 0.1 cm 06/08/23 1327   Wound Surface Area (cm^2) 8 cm^2 06/08/23 1327   Change in Wound Size % (l*w) 85.45 06/08/23 1327   Wound Volume (cm^3) 0.8 cm^3 06/08/23 1327   Wound Healing % 85 06/08/23 1327   Wound Assessment Pink/red 05/25/23 1353   Drainage Amount None 06/08/23

## 2023-06-08 NOTE — FLOWSHEET NOTE
06/08/23 1327   Anesthetic   Anesthetic 4% Lidocaine Liquid Topical   Wound 05/18/23 Leg Lower;Right   Date First Assessed: 05/18/23   Present on Hospital Admission: Yes  Wound Approximate Age at First Assessment (Weeks): 60 weeks  Location: Leg  Wound Location Orientation: Lower;Right   Wound Image    Dressing Status   (open to air)   Wound Cleansed Irrigated with saline   Offloading for Diabetic Foot Ulcers Offloading not required   Wound Length (cm) 4 cm   Wound Width (cm) 2 cm   Wound Depth (cm) 0.1 cm   Wound Surface Area (cm^2) 8 cm^2   Change in Wound Size % (l*w) 85.45   Wound Volume (cm^3) 0.8 cm^3   Wound Healing % 85   Wound Assessment   (Dried exudate)   Drainage Amount None   Odor None   Naye-wound Assessment Blanchable erythema   Margins Undefined edges   Wound Thickness Description not for Pressure Injury Full thickness     /73   Pulse 76   Temp 97 °F (36.1 °C) (Temporal)   Resp 18

## 2023-06-08 NOTE — FLOWSHEET NOTE
06/08/23 1357   Right Leg Edema Point of Measurement   Compression Therapy Tubular elastic support bandage   Tubular Elastic Support Bandage Compression Pressure Medium   Wound 05/18/23 Leg Lower;Right   Date First Assessed: 05/18/23   Present on Hospital Admission: Yes  Wound Approximate Age at First Assessment (Weeks): 60 weeks  Location: Leg  Wound Location Orientation: Lower;Right   Dressing Status New dressing applied   Dressing/Treatment Alginate with Ag;Gauze dressing/dressing sponge;Roll gauze

## 2023-06-22 ENCOUNTER — HOSPITAL ENCOUNTER (OUTPATIENT)
Facility: HOSPITAL | Age: 78
Discharge: HOME | End: 2023-06-22
Payer: MEDICARE

## 2023-06-22 VITALS — HEART RATE: 108 BPM | SYSTOLIC BLOOD PRESSURE: 122 MMHG | TEMPERATURE: 97.8 F | DIASTOLIC BLOOD PRESSURE: 82 MMHG

## 2023-06-22 DIAGNOSIS — I83.019 VENOUS STASIS ULCER OF RIGHT LOWER EXTREMITY (HCC): Primary | ICD-10-CM

## 2023-06-22 DIAGNOSIS — L97.919 VENOUS STASIS ULCER OF RIGHT LOWER EXTREMITY (HCC): Primary | ICD-10-CM

## 2023-06-22 PROCEDURE — 99213 OFFICE O/P EST LOW 20 MIN: CPT

## 2023-06-22 RX ORDER — CLOBETASOL PROPIONATE 0.5 MG/G
OINTMENT TOPICAL ONCE
OUTPATIENT
Start: 2023-06-22 | End: 2023-06-22

## 2023-06-22 RX ORDER — GENTAMICIN SULFATE 1 MG/G
OINTMENT TOPICAL ONCE
OUTPATIENT
Start: 2023-06-22 | End: 2023-06-22

## 2023-06-22 RX ORDER — IBUPROFEN 200 MG
TABLET ORAL ONCE
OUTPATIENT
Start: 2023-06-22 | End: 2023-06-22

## 2023-06-22 RX ORDER — LIDOCAINE 40 MG/G
CREAM TOPICAL ONCE
OUTPATIENT
Start: 2023-06-22 | End: 2023-06-22

## 2023-06-22 RX ORDER — SODIUM CHLOR/HYPOCHLOROUS ACID 0.033 %
SOLUTION, IRRIGATION IRRIGATION ONCE
OUTPATIENT
Start: 2023-06-22 | End: 2023-06-22

## 2023-06-22 RX ORDER — AMIODARONE HYDROCHLORIDE 100 MG/1
TABLET ORAL DAILY
COMMUNITY

## 2023-06-22 RX ORDER — BETAMETHASONE DIPROPIONATE 0.05 %
OINTMENT (GRAM) TOPICAL ONCE
OUTPATIENT
Start: 2023-06-22 | End: 2023-06-22

## 2023-06-22 RX ORDER — LIDOCAINE HYDROCHLORIDE 20 MG/ML
JELLY TOPICAL ONCE
OUTPATIENT
Start: 2023-06-22 | End: 2023-06-22

## 2023-06-22 RX ORDER — LIDOCAINE HYDROCHLORIDE 40 MG/ML
SOLUTION TOPICAL ONCE
OUTPATIENT
Start: 2023-06-22 | End: 2023-06-22

## 2023-06-22 RX ORDER — GINSENG 100 MG
CAPSULE ORAL ONCE
OUTPATIENT
Start: 2023-06-22 | End: 2023-06-22

## 2023-06-22 RX ORDER — LIDOCAINE 50 MG/G
OINTMENT TOPICAL ONCE
OUTPATIENT
Start: 2023-06-22 | End: 2023-06-22

## 2023-06-22 RX ORDER — BACITRACIN ZINC AND POLYMYXIN B SULFATE 500; 1000 [USP'U]/G; [USP'U]/G
OINTMENT TOPICAL ONCE
OUTPATIENT
Start: 2023-06-22 | End: 2023-06-22

## 2023-06-22 NOTE — PROGRESS NOTES
Wound Width (cm) 0.7 cm   Wound Depth (cm) 0.1 cm   Wound Surface Area (cm^2) 2.1 cm^2   Change in Wound Size % (l*w) 96.18   Wound Volume (cm^3) 0.21 cm^3   Wound Healing % 96   Wound Assessment Slough   Drainage Amount Small   Drainage Description Serosanguinous   Odor None   Naye-wound Assessment Dry/flaky; Blanchable erythema   Margins Flat/open edges   Wound Thickness Description not for Pressure Injury Full thickness   Pain Assessment   Pain Assessment None - Denies Pain      /82   Pulse (!) 108   Temp 97.8 °F (36.6 °C) (Temporal)     Assessment:  Right leg venous stasis ulcer     Plan:   - Patient seen and evaluated today. - Right leg wound is improved today and only a small open wound noted. No signs of acute infection. Applied a silver alginate dressing today with Xeroform to prevent adherence to the wound. Patient would like to continue with Tubigrip and can transition to compression stockings once wound has healed. - Follow-up in 2 weeks.

## 2023-06-22 NOTE — DISCHARGE INSTRUCTIONS
Discharge Instructions for          [place] [address] 1200 [facility], [address]  Phone: [phone] Fax: [phone]    NAME:  Jerry Hua  YOB: 1945  MEDICAL RECORD NUMBER:  [id number]  DATE:  June 22, 2023  WOUND CARE ORDERS:  Right lower extremity: Cleanse lower leg with baby shampoo, rinse and pat dry. Place xeroform to wound bed. Cover with gauze roll. Secure with tape. Change daily. Wear your compression stockings every day. Activity:  [x] Activity as tolerated: Elevate leg(s) above the level of the heart when sitting and a void prolonged standing in one place. Do no get dressing/wrap wet. [] Remain off Work:       [] May return to full duty work:                                     Dietary:  [] Diet as tolerated      [] Diabetic Diet            [] Increase Protein: examples (Meat, cheese, eggs, greek yogurt, fish, nuts)          [] [address]  [] Other:  [] Dial a Dietician : Call [employer] at [phone] enter code [phone] when prompted. M-F 9am-5pm EST. Return Appointment:  [] [id number] *** Week(s)  [] Nurse Visit : *** days  [] Ordered tests:    Electronically signed Bee Suero RN on 6/22/2023 at 1:30 PM     [address]: Should you experience any significant changes in your wound(s) or have questions about your wound care, please contact the [address] [place] [address] at [address] [place] [address] 8:00 am - 4:30. If you need help with your wound outside these hours and cannot wait until we are again available, contact your PCP or go to the hospital emergency room. PLEASE NOTE: IF YOU ARE UNABLE TO OBTAIN WOUND SUPPLIES, CONTINUE TO USE THE SUPPLIES YOU HAVE AVAILABLE UNTIL YOU ARE ABLE TO REACH US. IT IS MOST IMPORTANT TO KEEP THE WOUND COVERED AT ALL TIMES.

## 2023-07-13 ENCOUNTER — HOSPITAL ENCOUNTER (OUTPATIENT)
Facility: HOSPITAL | Age: 78
Discharge: HOME | End: 2023-07-13
Payer: MEDICARE

## 2023-07-13 VITALS — DIASTOLIC BLOOD PRESSURE: 65 MMHG | RESPIRATION RATE: 18 BRPM | HEART RATE: 79 BPM | SYSTOLIC BLOOD PRESSURE: 112 MMHG

## 2023-07-13 DIAGNOSIS — I83.019 VENOUS STASIS ULCER OF RIGHT LOWER EXTREMITY (HCC): Primary | ICD-10-CM

## 2023-07-13 DIAGNOSIS — L97.919 VENOUS STASIS ULCER OF RIGHT LOWER EXTREMITY (HCC): Primary | ICD-10-CM

## 2023-07-13 PROCEDURE — 99212 OFFICE O/P EST SF 10 MIN: CPT

## 2023-07-13 RX ORDER — CLOBETASOL PROPIONATE 0.5 MG/G
OINTMENT TOPICAL ONCE
Status: CANCELLED | OUTPATIENT
Start: 2023-07-13 | End: 2023-07-13

## 2023-07-13 RX ORDER — GINSENG 100 MG
CAPSULE ORAL ONCE
Status: CANCELLED | OUTPATIENT
Start: 2023-07-13 | End: 2023-07-13

## 2023-07-13 RX ORDER — LIDOCAINE HYDROCHLORIDE 20 MG/ML
JELLY TOPICAL ONCE
Status: CANCELLED | OUTPATIENT
Start: 2023-07-13 | End: 2023-07-13

## 2023-07-13 RX ORDER — LIDOCAINE 50 MG/G
OINTMENT TOPICAL ONCE
Status: CANCELLED | OUTPATIENT
Start: 2023-07-13 | End: 2023-07-13

## 2023-07-13 RX ORDER — BETAMETHASONE DIPROPIONATE 0.05 %
OINTMENT (GRAM) TOPICAL ONCE
Status: CANCELLED | OUTPATIENT
Start: 2023-07-13 | End: 2023-07-13

## 2023-07-13 RX ORDER — SODIUM CHLOR/HYPOCHLOROUS ACID 0.033 %
SOLUTION, IRRIGATION IRRIGATION ONCE
Status: CANCELLED | OUTPATIENT
Start: 2023-07-13 | End: 2023-07-13

## 2023-07-13 RX ORDER — GENTAMICIN SULFATE 1 MG/G
OINTMENT TOPICAL ONCE
Status: CANCELLED | OUTPATIENT
Start: 2023-07-13 | End: 2023-07-13

## 2023-07-13 RX ORDER — LIDOCAINE HYDROCHLORIDE 40 MG/ML
SOLUTION TOPICAL ONCE
Status: CANCELLED | OUTPATIENT
Start: 2023-07-13 | End: 2023-07-13

## 2023-07-13 RX ORDER — IBUPROFEN 200 MG
TABLET ORAL ONCE
Status: CANCELLED | OUTPATIENT
Start: 2023-07-13 | End: 2023-07-13

## 2023-07-13 RX ORDER — BACITRACIN ZINC AND POLYMYXIN B SULFATE 500; 1000 [USP'U]/G; [USP'U]/G
OINTMENT TOPICAL ONCE
Status: CANCELLED | OUTPATIENT
Start: 2023-07-13 | End: 2023-07-13

## 2023-07-13 RX ORDER — LIDOCAINE 40 MG/G
CREAM TOPICAL ONCE
Status: CANCELLED | OUTPATIENT
Start: 2023-07-13 | End: 2023-07-13

## 2023-07-13 NOTE — PROGRESS NOTES
[facility]   Medical Staff Progress Note     Jhon Dick  MEDICAL RECORD NUMBER:  [id number]  AGE: 78 y.o. GENDER: male  : 1945  EPISODE DATE:  2023    Chief complaint and reason for visit:     Chief Complaint   Patient presents for follow up of his non healing right lower leg wound     Wound Check     Legs. Patient presenting for follow up evaluation of wound(s) per chief complaint. Subjective:  Mr Nanette Pacheco returns for follow up of his right lower leg wound that came about from a log incidence. Since last seen 3 weeks ago, with Dr Hossein Monroy, following debridement and silver alginate and compression dressing, the patient had venous incompetent valve surgery and has done well. He reports his wound has improved with the silver alginate and xeroform. Today his wound is closed surround with dry skin. He continues to wear compression stockings. Medical Decision Making:     Problem List Items Addressed This Visit          Other    Venous stasis ulcer of right lower extremity ([address]) - Primary       Wounds and Treatment Plan:   Right lower leg wound - this has healed over very well. Overlying skin is with dry dermis. No bleeding or open area. Mild edema. Area with erythema and pigmented skin from hemosiderin. No tenderness. No fever. Treatment - no further treatment. DC from [facility]    Other associated diagnoses or problems addressed:  none    Pertinent imaging reviewed including independent interpretation include:  none    Pertinent labs reviewed. Medical records and review of external note (s) from other providers done as well. New lab or imaging orders placed:  none    Prescription drug management: N/A     Discussion of management or test interpretation with  patient . Comorbid conditions affecting wound healing: As per PMH which was reviewed.     Risk of complications and/or mortality of patient
no added sodium    [] Diabetic Diet            [] Increase Protein: examples (Meat, cheese, eggs, greek yogurt, fish, nuts)          [] [address]  Return Appointment:  [x] Return Appointment: No follow up needed, you are healed. Congratulations! [] Nurse Visit : 0 days  [] Ordered tests:    Electronically signed Charles Cordoba RN on 7/13/2023 at [facility]: Should you experience any significant changes in your wound(s) or have questions about your wound care, please contact the [address] [place] [address] at [address] [place] [address] 8:00 am - 4:30. If you need help with your wound outside these hours and cannot wait until we are again available, contact your PCP or go to the hospital emergency room. PLEASE NOTE: IF YOU ARE UNABLE TO OBTAIN WOUND SUPPLIES, CONTINUE TO USE THE SUPPLIES YOU HAVE AVAILABLE UNTIL YOU ARE ABLE TO REACH US. IT IS MOST IMPORTANT TO KEEP THE WOUND COVERED AT ALL TIMES.      Physician Signature:_______________________    Date: ___________ Time:  ____________         Electronically signed by Fernando Delgado MD on 7/13/2023 at 5:27 PM

## 2023-07-13 NOTE — DISCHARGE INSTRUCTIONS
Discharge Instructions for          [address]  Phone: [phone] Fax: [phone]    NAME:  Eli Briceno  YOB: 1945  MEDICAL RECORD NUMBER:  [id number]  DATE:  July 13, 2023  WOUND CARE ORDERS:  Moisturize and wear your compression stockings daily. Activity:  [x] Elevate leg(s) above the level of the heart when sitting. [x] Avoid prolonged standing in one place. Dietary:  [x] Diet as tolerated; no added sodium    [] Diabetic Diet            [] Increase Protein: examples (Meat, cheese, eggs, greek yogurt, fish, nuts)          [] [address]  Return Appointment:  [x] Return Appointment: No follow up needed, you are healed. Congratulations! [] Nurse Visit : *** days  [] Ordered tests:    Electronically signed Turner Reynolds RN on 7/13/2023 at [address] [place] [address]: Should you experience any significant changes in your wound(s) or have questions about your wound care, please contact the [address] [place] [address] at [address] [place] [address] 8:00 am - 4:30. If you need help with your wound outside these hours and cannot wait until we are again available, contact your PCP or go to the hospital emergency room. PLEASE NOTE: IF YOU ARE UNABLE TO OBTAIN WOUND SUPPLIES, CONTINUE TO USE THE SUPPLIES YOU HAVE AVAILABLE UNTIL YOU ARE ABLE TO REACH US. IT IS MOST IMPORTANT TO KEEP THE WOUND COVERED AT ALL TIMES.      Physician Signature:_______________________    Date: ___________ Time:  ____________

## 2023-07-13 NOTE — FLOWSHEET NOTE
07/13/23 1339   Wound 05/18/23 Leg Lower;Right   Date First Assessed: 05/18/23   Present on Hospital Admission: Yes  Wound Approximate Age at First Assessment (Weeks): 60 weeks  Location: Leg  Wound Location Orientation: Lower;Right   Wound Image    Dressing Status Clean;Dry; Intact   Wound Cleansed Cleansed with saline   Wound Length (cm) 0 cm   Wound Width (cm) 0 cm   Wound Depth (cm) 0 cm   Wound Surface Area (cm^2) 0 cm^2   Change in Wound Size % (l*w) 100   Wound Volume (cm^3) 0 cm^3   Wound Healing % 100   Wound Assessment Pink/red   Drainage Amount None   Odor None   Naye-wound Assessment Blanchable erythema;Dry/flaky   Margins Attached edges     /65   Pulse 79   Resp 18

## 2023-11-16 ENCOUNTER — HOSPITAL ENCOUNTER (OUTPATIENT)
Facility: HOSPITAL | Age: 78
Discharge: HOME | End: 2023-11-16
Attending: RADIOLOGY
Payer: MEDICARE

## 2023-11-16 VITALS
TEMPERATURE: 97.6 F | SYSTOLIC BLOOD PRESSURE: 129 MMHG | HEART RATE: 60 BPM | DIASTOLIC BLOOD PRESSURE: 78 MMHG | RESPIRATION RATE: 16 BRPM

## 2023-11-16 DIAGNOSIS — L97.919 VENOUS STASIS ULCER OF RIGHT LOWER EXTREMITY (HCC): Primary | ICD-10-CM

## 2023-11-16 DIAGNOSIS — I83.019 VENOUS STASIS ULCER OF RIGHT LOWER EXTREMITY (HCC): Primary | ICD-10-CM

## 2023-11-16 DIAGNOSIS — I87.2 VENOUS STASIS DERMATITIS OF BOTH LOWER EXTREMITIES: ICD-10-CM

## 2023-11-16 PROCEDURE — 99202 OFFICE O/P NEW SF 15 MIN: CPT

## 2023-11-16 PROCEDURE — 99202 OFFICE O/P NEW SF 15 MIN: CPT | Performed by: SURGERY

## 2023-11-16 RX ORDER — LIDOCAINE HYDROCHLORIDE 40 MG/ML
SOLUTION TOPICAL ONCE
Status: CANCELLED | OUTPATIENT
Start: 2023-11-16 | End: 2023-11-16

## 2023-11-16 RX ORDER — IBUPROFEN 200 MG
TABLET ORAL ONCE
Status: CANCELLED | OUTPATIENT
Start: 2023-11-16 | End: 2023-11-16

## 2023-11-16 RX ORDER — LIDOCAINE 40 MG/G
CREAM TOPICAL ONCE
Status: CANCELLED | OUTPATIENT
Start: 2023-11-16 | End: 2023-11-16

## 2023-11-16 RX ORDER — LIDOCAINE HYDROCHLORIDE 20 MG/ML
JELLY TOPICAL ONCE
Status: CANCELLED | OUTPATIENT
Start: 2023-11-16 | End: 2023-11-16

## 2023-11-16 RX ORDER — BETAMETHASONE DIPROPIONATE 0.5 MG/G
CREAM TOPICAL ONCE
Status: CANCELLED | OUTPATIENT
Start: 2023-11-16 | End: 2023-11-16

## 2023-11-16 RX ORDER — TRIAMCINOLONE ACETONIDE 1 MG/G
OINTMENT TOPICAL ONCE
Status: CANCELLED | OUTPATIENT
Start: 2023-11-16 | End: 2023-11-16

## 2023-11-16 RX ORDER — LIDOCAINE 50 MG/G
OINTMENT TOPICAL ONCE
Status: CANCELLED | OUTPATIENT
Start: 2023-11-16 | End: 2023-11-16

## 2023-11-16 RX ORDER — GINSENG 100 MG
CAPSULE ORAL ONCE
Status: CANCELLED | OUTPATIENT
Start: 2023-11-16 | End: 2023-11-16

## 2023-11-16 RX ORDER — GENTAMICIN SULFATE 1 MG/G
OINTMENT TOPICAL ONCE
Status: CANCELLED | OUTPATIENT
Start: 2023-11-16 | End: 2023-11-16

## 2023-11-16 RX ORDER — SODIUM CHLOR/HYPOCHLOROUS ACID 0.033 %
SOLUTION, IRRIGATION IRRIGATION ONCE
Status: CANCELLED | OUTPATIENT
Start: 2023-11-16 | End: 2023-11-16

## 2023-11-16 RX ORDER — BACITRACIN ZINC AND POLYMYXIN B SULFATE 500; 1000 [USP'U]/G; [USP'U]/G
OINTMENT TOPICAL ONCE
Status: CANCELLED | OUTPATIENT
Start: 2023-11-16 | End: 2023-11-16

## 2023-11-16 RX ORDER — CEPHALEXIN 500 MG/1
500 CAPSULE ORAL 4 TIMES DAILY
COMMUNITY
Start: 2023-11-06 | End: 2023-11-20

## 2023-11-16 RX ORDER — CLOBETASOL PROPIONATE 0.5 MG/G
OINTMENT TOPICAL ONCE
Status: CANCELLED | OUTPATIENT
Start: 2023-11-16 | End: 2023-11-16

## 2023-11-16 ASSESSMENT — PAIN SCALES - GENERAL: PAINLEVEL_OUTOF10: 1

## 2023-11-16 ASSESSMENT — ENCOUNTER SYMPTOMS: RESPIRATORY NEGATIVE: 1

## 2023-11-16 ASSESSMENT — PAIN DESCRIPTION - ORIENTATION: ORIENTATION: RIGHT

## 2023-11-16 ASSESSMENT — PAIN DESCRIPTION - LOCATION: LOCATION: LEG

## 2023-11-16 NOTE — PROGRESS NOTES
WOUND CARE PROGRESS       Subjective:     Patient   Patient presents to wound clinic for: Alex Allen is a 70 y.o. male with history of congestive heart failure who presents for right leg wound FU    As of my visit with pt May 2023,  Patient stated he had had a right leg wound since December after scraping his leg on a log while working outside. Patient reports that it has gotten worse over the last month and his PCP got a culture at most recent visit. Patient was subsequently called in a prescription for Bactrim which he is currently taking. Patient reports minimal pain to the area. He has an appointment with a vascular surgeon next week for blood flow studies. Denies nausea, chills, vomiting, fever, sob or chest pain. Presents with unna boot dressing today. Patient has not noted any further open wounds or weeping, apparently the Unna boot was removed at home by the patient several days prior to this visit on May 25, 2023 because he fell and apparently the dressing got wet. No other injuries or concerns. Patient's last LVEF was 35% by his understanding on echo, previously 15% on cardiac catheterization. 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 Was followed up by Dr. Darrin Mcnulty in June and July 2023 ultimately discharged when the right leg wound healed. Patient scheduled follow-up November 16, 2023. Patient apparently had been traveling on a cruise and developed COVID and illness and was treated with IV antibiotics for suspected cellulitis although this may have been venous stasis dermatitis. He apparently had some open ulcerations of his right leg when he made the appointment a couple of weeks ago but has been using his compression stockings regularly and wounds have healed at this point.         Review of Systems   Constitutional:         See HPI

## 2023-11-16 NOTE — DISCHARGE INSTRUCTIONS
Discharge Instructions for          [address], [address]  Phone: [phone] Fax: [phone]    NAME:  Lovenia Sandhoff  YOB: 1945  MEDICAL RECORD NUMBER:  [id number]  DATE:  November 16, 2023  WOUND CARE ORDERS:  Left lower leg: no open wounds preset at this time. Gently exfoliate dry skin with wash cloth and moisturize daily. Elevate legs as whenever you are sitting. Avoid standing in one place for too long. Wear your compression stockings daily (20-30mmHG). Put on first thing in the morning, remove at night for sleeping. Activity:  [x] Elevate leg(s) above the level of the heart when sitting. [x] Avoid prolonged standing in one place. [] Do no get dressing/wrap wet. Dietary:  [x] Diet as tolerated      [] Diabetic Diet            [] Increase Protein: examples (Meat, cheese, eggs, greek yogurt, fish, nuts)          [] [employer]  [] Other:  [] Dial a Dietician : Call [facility] at [phone] enter code [phone]) when prompted. M-F 9am-5pm EST. Return Appointment:  [] Return Appointment: as joshua  [] Nurse Visit : *** days  [] Ordered tests:    Electronically signed Noreen Monroe RN on 11/16/2023 at 10:38 AM     [address]: Should you experience any significant changes in your wound(s) or have questions about your wound care, please contact the [address] [place] [address] at [address] [employer] [place] 8:00 am - 4:30. If you need help with your wound outside these hours and cannot wait until we are again available, contact your PCP or go to the hospital emergency room. PLEASE NOTE: IF YOU ARE UNABLE TO OBTAIN WOUND SUPPLIES, CONTINUE TO USE THE SUPPLIES YOU HAVE AVAILABLE UNTIL YOU ARE ABLE TO REACH US. IT IS MOST IMPORTANT TO KEEP THE WOUND COVERED AT ALL TIMES.      Physician

## 2023-12-14 PROBLEM — I48.91 ATRIAL FIBRILLATION (MULTI): Status: ACTIVE | Noted: 2023-12-14

## 2023-12-14 PROBLEM — Z86.010 HISTORY OF COLON POLYPS: Status: ACTIVE | Noted: 2023-12-14

## 2023-12-14 PROBLEM — E66.3 OVERWEIGHT: Status: ACTIVE | Noted: 2023-12-14

## 2023-12-14 PROBLEM — H26.493 PCO (POSTERIOR CAPSULAR OPACIFICATION), BILATERAL: Status: ACTIVE | Noted: 2023-07-26

## 2023-12-14 PROBLEM — R07.9 CHEST PAIN: Status: ACTIVE | Noted: 2023-12-14

## 2023-12-14 PROBLEM — I25.10 ATHEROSCLEROSIS OF CORONARY ARTERY: Status: ACTIVE | Noted: 2023-12-14

## 2023-12-14 PROBLEM — H35.372 EPIRETINAL MEMBRANE (ERM) OF LEFT EYE: Status: ACTIVE | Noted: 2023-07-26

## 2023-12-14 PROBLEM — D64.9 ANEMIA: Status: ACTIVE | Noted: 2023-12-14

## 2023-12-14 PROBLEM — K63.5 COLON POLYPS: Status: ACTIVE | Noted: 2023-12-14

## 2023-12-14 PROBLEM — E78.00 HYPERCHOLESTEROLEMIA: Status: ACTIVE | Noted: 2023-12-14

## 2023-12-14 PROBLEM — R00.1 SINUS BRADYCARDIA: Status: ACTIVE | Noted: 2023-12-14

## 2023-12-14 PROBLEM — K64.8 INTERNAL HEMORRHOIDS: Status: ACTIVE | Noted: 2023-12-14

## 2023-12-14 PROBLEM — E78.5 HYPERLIPIDEMIA: Status: ACTIVE | Noted: 2023-12-14

## 2023-12-14 PROBLEM — I10 ESSENTIAL HYPERTENSION, BENIGN: Status: ACTIVE | Noted: 2023-12-14

## 2023-12-14 PROBLEM — K40.90 RIGHT INGUINAL HERNIA: Status: ACTIVE | Noted: 2023-12-14

## 2023-12-14 PROBLEM — I25.119 CORONARY ARTERY DISEASE INVOLVING NATIVE CORONARY ARTERY WITH ANGINA PECTORIS (CMS-HCC): Status: ACTIVE | Noted: 2023-12-14

## 2023-12-14 PROBLEM — H01.00B BLEPHARITIS OF UPPER AND LOWER EYELIDS OF BOTH EYES: Status: ACTIVE | Noted: 2023-07-26

## 2023-12-14 PROBLEM — H04.123 DRY EYES: Status: ACTIVE | Noted: 2023-07-26

## 2023-12-14 PROBLEM — H01.00A BLEPHARITIS OF UPPER AND LOWER EYELIDS OF BOTH EYES: Status: ACTIVE | Noted: 2023-07-26

## 2023-12-14 PROBLEM — G47.33 OSA ON CPAP: Status: ACTIVE | Noted: 2023-12-14

## 2023-12-14 PROBLEM — Z86.0100 HISTORY OF COLON POLYPS: Status: ACTIVE | Noted: 2023-12-14

## 2023-12-14 PROBLEM — M17.9 OA (OSTEOARTHRITIS) OF KNEE: Status: ACTIVE | Noted: 2023-12-14

## 2023-12-14 RX ORDER — IBUPROFEN 100 MG/5ML
1 SUSPENSION, ORAL (FINAL DOSE FORM) ORAL DAILY
COMMUNITY

## 2023-12-14 RX ORDER — ZINC GLUCONATE 100 MG
1 TABLET ORAL DAILY
COMMUNITY

## 2023-12-14 RX ORDER — ROSUVASTATIN CALCIUM 40 MG/1
1 TABLET, COATED ORAL NIGHTLY
COMMUNITY
End: 2024-01-09 | Stop reason: SDUPTHER

## 2023-12-14 RX ORDER — DILTIAZEM HYDROCHLORIDE 240 MG/1
240 CAPSULE, COATED, EXTENDED RELEASE ORAL DAILY
COMMUNITY
Start: 2023-06-23 | End: 2024-01-09 | Stop reason: SDUPTHER

## 2023-12-14 RX ORDER — DOFETILIDE 0.5 MG/1
1 CAPSULE ORAL 2 TIMES DAILY
COMMUNITY
End: 2024-01-09 | Stop reason: SDUPTHER

## 2023-12-14 RX ORDER — APIXABAN 5 MG/1
5 TABLET, FILM COATED ORAL 2 TIMES DAILY
COMMUNITY
Start: 2023-06-23 | End: 2024-01-09 | Stop reason: SDUPTHER

## 2023-12-14 RX ORDER — NITROGLYCERIN 0.4 MG/1
0.4 TABLET SUBLINGUAL EVERY 5 MIN PRN
COMMUNITY
Start: 2023-06-22

## 2023-12-14 RX ORDER — POTASSIUM CHLORIDE 1500 MG/1
2 TABLET, EXTENDED RELEASE ORAL DAILY
COMMUNITY
Start: 2023-06-23 | End: 2024-01-09 | Stop reason: SDUPTHER

## 2023-12-14 RX ORDER — ASPIRIN 81 MG/1
81 TABLET ORAL
COMMUNITY

## 2023-12-15 ENCOUNTER — OFFICE VISIT (OUTPATIENT)
Dept: CARDIOLOGY | Facility: CLINIC | Age: 78
End: 2023-12-15
Payer: MEDICARE

## 2023-12-15 VITALS
WEIGHT: 209 LBS | HEIGHT: 72 IN | SYSTOLIC BLOOD PRESSURE: 130 MMHG | HEART RATE: 62 BPM | DIASTOLIC BLOOD PRESSURE: 82 MMHG | BODY MASS INDEX: 28.31 KG/M2

## 2023-12-15 DIAGNOSIS — I10 ESSENTIAL HYPERTENSION, BENIGN: ICD-10-CM

## 2023-12-15 DIAGNOSIS — G47.33 OSA ON CPAP: ICD-10-CM

## 2023-12-15 DIAGNOSIS — E78.2 MIXED HYPERLIPIDEMIA: ICD-10-CM

## 2023-12-15 DIAGNOSIS — R00.1 SINUS BRADYCARDIA: ICD-10-CM

## 2023-12-15 DIAGNOSIS — I25.10 ATHEROSCLEROSIS OF NATIVE CORONARY ARTERY OF NATIVE HEART WITHOUT ANGINA PECTORIS: ICD-10-CM

## 2023-12-15 DIAGNOSIS — I48.91 ATRIAL FIBRILLATION, UNSPECIFIED TYPE (MULTI): Primary | ICD-10-CM

## 2023-12-15 PROBLEM — E78.5 HYPERLIPIDEMIA: Status: RESOLVED | Noted: 2023-12-14 | Resolved: 2023-12-15

## 2023-12-15 PROBLEM — E78.00 HYPERCHOLESTEROLEMIA: Status: RESOLVED | Noted: 2023-12-14 | Resolved: 2023-12-15

## 2023-12-15 PROBLEM — E66.3 OVERWEIGHT: Status: RESOLVED | Noted: 2023-12-14 | Resolved: 2023-12-15

## 2023-12-15 PROBLEM — I25.119 CORONARY ARTERY DISEASE INVOLVING NATIVE CORONARY ARTERY WITH ANGINA PECTORIS (CMS-HCC): Status: RESOLVED | Noted: 2023-12-14 | Resolved: 2023-12-15

## 2023-12-15 PROBLEM — R07.9 CHEST PAIN: Status: RESOLVED | Noted: 2023-12-14 | Resolved: 2023-12-15

## 2023-12-15 PROCEDURE — 93000 ELECTROCARDIOGRAM COMPLETE: CPT | Performed by: INTERNAL MEDICINE

## 2023-12-15 PROCEDURE — 3075F SYST BP GE 130 - 139MM HG: CPT | Performed by: INTERNAL MEDICINE

## 2023-12-15 PROCEDURE — 3079F DIAST BP 80-89 MM HG: CPT | Performed by: INTERNAL MEDICINE

## 2023-12-15 PROCEDURE — 1159F MED LIST DOCD IN RCRD: CPT | Performed by: INTERNAL MEDICINE

## 2023-12-15 PROCEDURE — 1036F TOBACCO NON-USER: CPT | Performed by: INTERNAL MEDICINE

## 2023-12-15 PROCEDURE — 1160F RVW MEDS BY RX/DR IN RCRD: CPT | Performed by: INTERNAL MEDICINE

## 2023-12-15 PROCEDURE — 99214 OFFICE O/P EST MOD 30 MIN: CPT | Performed by: INTERNAL MEDICINE

## 2023-12-15 RX ORDER — ACETAMINOPHEN 500 MG
1 TABLET ORAL DAILY
COMMUNITY

## 2023-12-15 NOTE — PROGRESS NOTES
Subjective   Robinson Resendiz is a 78 y.o. male       Chief Complaint    Follow-up          HPI   Patient is here for follow-up continue management for coronary artery disease, atrial fibrillation, hyperlipidemia long-term anticoagulation.  Since last time I saw him he reported feeling well.  He underwent hernia surgery following which he had few episodes of brief A-fib.  But since then he has done well he denies any complaint and described functional class I.    ASSESSMENT:      1. Coronary artery disease with remote PCI to the LAD. No chest pain. Last cardiac catheterization in [place] showed no residual disease. Continues describe functional class I  2. Hypertension, controlled.  3. Hyperlipidemia, controlled   4. Mildly overweight. With a recent pound weight loss  5. Previous history of hypokalemia, resolved.  6. Persistent t atrial fibrillation status post prior cardioversion currently in normal sinus rhythm on dofetilide tolerating that well. QTc interval is acceptable. He is on long-term anticoagulation  7. Long-term anticoagulation due to atrial fibrillation tolerating that well  8. Prior right arm hematoma resolve  9.  Sleep apnea on CPAP  RECOMMENDATION:      1. The patient was advised to continue present medical therapy  2. The patient will be seen back in the office in 6-month  3. I reviewed with him his recent lab with him  4. Patient was advised to notify me change in cardiac status or symptoms    Review of Systems   All other systems reviewed and are negative.         Visit Vitals  /82 (BP Location: Left arm, Patient Position: Sitting)   Pulse 62   Ht 1.829 m (6')   Wt 94.8 kg (209 lb)   BMI 28.35 kg/m²   Smoking Status Former   BSA 2.19 m²      EKG done in office today    Objective   Physical Exam  Constitutional:       Appearance: Normal appearance. He is normal weight.   HENT:      Nose: Nose normal.   Neck:      Vascular: No carotid bruit.   Cardiovascular:      Rate and Rhythm: Normal rate.       Pulses: Normal pulses.      Heart sounds: Normal heart sounds.   Pulmonary:      Effort: Pulmonary effort is normal.   Abdominal:      General: Bowel sounds are normal.      Palpations: Abdomen is soft.   Genitourinary:     Rectum: Normal.   Musculoskeletal:         General: Normal range of motion.      Cervical back: Normal range of motion.      Right lower leg: No edema.      Left lower leg: No edema.   Skin:     General: Skin is warm and dry.   Neurological:      General: No focal deficit present.      Mental Status: He is alert.   Psychiatric:         Mood and Affect: Mood normal.         Behavior: Behavior normal.         Thought Content: Thought content normal.         Judgment: Judgment normal.         Current Medications    Current Outpatient Medications:     ascorbic acid (Vitamin C) 1,000 mg tablet, Take 1 tablet (1,000 mg) by mouth once daily., Disp: , Rfl:     aspirin 81 mg EC tablet, Take 1 tablet (81 mg) by mouth. 1 tablet twice weekly, Disp: , Rfl:     cholecalciferol (Vitamin D3) 50 mcg (2,000 unit) capsule, Take 1 capsule (50 mcg) by mouth once daily., Disp: , Rfl:     dilTIAZem CD (Cardizem CD) 240 mg 24 hr capsule, Take 1 capsule (240 mg) by mouth once daily., Disp: , Rfl:     dofetilide (Tikosyn) 500 mcg capsule, Take 1 capsule (500 mcg) by mouth 2 times a day., Disp: , Rfl:     Eliquis 5 mg tablet, Take 1 tablet (5 mg) by mouth 2 times a day., Disp: , Rfl:     Klor-Con M20 20 mEq ER tablet, Take 2 tablets (40 mEq) by mouth once daily., Disp: , Rfl:     nitroglycerin (Nitrostat) 0.4 mg SL tablet, Place 1 tablet (0.4 mg) under the tongue every 5 minutes if needed for chest pain., Disp: , Rfl:     rosuvastatin (Crestor) 40 mg tablet, Take 1 tablet (40 mg) by mouth once daily at bedtime., Disp: , Rfl:     zinc gluconate 100 mg tablet, Take 1 tablet (100 mg) by mouth once daily., Disp: , Rfl:                      Assessment/Plan   1. Atrial fibrillation, unspecified type (CMS/HCC)        2. Sinus  bradycardia               Scribe Attestation  By signing my name below, I, Melanie ESTEVES LPN , Scribe   attest that this documentation has been prepared under the direction and in the presence of Tammy Diaz MD.

## 2023-12-15 NOTE — LETTER
December 15, 2023     Arnie Woo DO  [address]  [facility] [address]    Patient: Robinson Resendiz   YOB: 1945   Date of Visit: 12/15/2023       Dear Dr. Arnie Woo DO:    Thank you for referring Robinson Resendiz to me for evaluation. Below are my notes for this consultation.  If you have questions, please do not hesitate to call me. I look forward to following your patient along with you.       Sincerely,     Tammy Diaz MD      CC: No Recipients  ______________________________________________________________________________________    Subjective   Robinson Resendiz is a 78 y.o. male       Chief Complaint    Follow-up          HPI   Patient is here for follow-up continue management for coronary artery disease, atrial fibrillation, hyperlipidemia long-term anticoagulation.  Since last time I saw him he reported feeling well.  He underwent hernia surgery following which he had few episodes of brief A-fib.  But since then he has done well he denies any complaint and described functional class I.    ASSESSMENT:      1. Coronary artery disease with remote PCI to the LAD. No chest pain. Last cardiac catheterization in [place] showed no residual disease. Continues describe functional class I  2. Hypertension, controlled.  3. Hyperlipidemia, controlled   4. Mildly overweight. With a recent pound weight loss  5. Previous history of hypokalemia, resolved.  6. Persistent t atrial fibrillation status post prior cardioversion currently in normal sinus rhythm on dofetilide tolerating that well. QTc interval is acceptable. He is on long-term anticoagulation  7. Long-term anticoagulation due to atrial fibrillation tolerating that well  8. Prior right arm hematoma resolve  9.  Sleep apnea on CPAP  RECOMMENDATION:      1. The patient was advised to continue present medical therapy  2. The patient will be seen back in the office in 6-month  3. I reviewed with him his recent lab with him  4. Patient was advised to  notify me change in cardiac status or symptoms    Review of Systems   All other systems reviewed and are negative.         Visit Vitals  /82 (BP Location: Left arm, Patient Position: Sitting)   Pulse 62   Ht 1.829 m (6')   Wt 94.8 kg (209 lb)   BMI 28.35 kg/m²   Smoking Status Former   BSA 2.19 m²      EKG done in office today    Objective   Physical Exam  Constitutional:       Appearance: Normal appearance. He is normal weight.   HENT:      Nose: Nose normal.   Neck:      Vascular: No carotid bruit.   Cardiovascular:      Rate and Rhythm: Normal rate.      Pulses: Normal pulses.      Heart sounds: Normal heart sounds.   Pulmonary:      Effort: Pulmonary effort is normal.   Abdominal:      General: Bowel sounds are normal.      Palpations: Abdomen is soft.   Genitourinary:     Rectum: Normal.   Musculoskeletal:         General: Normal range of motion.      Cervical back: Normal range of motion.      Right lower leg: No edema.      Left lower leg: No edema.   Skin:     General: Skin is warm and dry.   Neurological:      General: No focal deficit present.      Mental Status: He is alert.   Psychiatric:         Mood and Affect: Mood normal.         Behavior: Behavior normal.         Thought Content: Thought content normal.         Judgment: Judgment normal.         Current Medications    Current Outpatient Medications:   •  ascorbic acid (Vitamin C) 1,000 mg tablet, Take 1 tablet (1,000 mg) by mouth once daily., Disp: , Rfl:   •  aspirin 81 mg EC tablet, Take 1 tablet (81 mg) by mouth. 1 tablet twice weekly, Disp: , Rfl:   •  cholecalciferol (Vitamin D3) 50 mcg (2,000 unit) capsule, Take 1 capsule (50 mcg) by mouth once daily., Disp: , Rfl:   •  dilTIAZem CD (Cardizem CD) 240 mg 24 hr capsule, Take 1 capsule (240 mg) by mouth once daily., Disp: , Rfl:   •  dofetilide (Tikosyn) 500 mcg capsule, Take 1 capsule (500 mcg) by mouth 2 times a day., Disp: , Rfl:   •  Eliquis 5 mg tablet, Take 1 tablet (5 mg) by mouth 2  times a day., Disp: , Rfl:   •  Klor-Con M20 20 mEq ER tablet, Take 2 tablets (40 mEq) by mouth once daily., Disp: , Rfl:   •  nitroglycerin (Nitrostat) 0.4 mg SL tablet, Place 1 tablet (0.4 mg) under the tongue every 5 minutes if needed for chest pain., Disp: , Rfl:   •  rosuvastatin (Crestor) 40 mg tablet, Take 1 tablet (40 mg) by mouth once daily at bedtime., Disp: , Rfl:   •  zinc gluconate 100 mg tablet, Take 1 tablet (100 mg) by mouth once daily., Disp: , Rfl:                      Assessment/Plan   1. Atrial fibrillation, unspecified type (CMS/HCC)        2. Sinus bradycardia               Scribe Attestation  By signing my name below, Melanie GARCIA LPN , Scribe   attest that this documentation has been prepared under the direction and in the presence of Tammy Diaz MD.

## 2024-01-09 DIAGNOSIS — I10 ESSENTIAL HYPERTENSION, BENIGN: ICD-10-CM

## 2024-01-09 DIAGNOSIS — I25.10 ATHEROSCLEROSIS OF NATIVE CORONARY ARTERY OF NATIVE HEART WITHOUT ANGINA PECTORIS: ICD-10-CM

## 2024-01-09 DIAGNOSIS — E78.2 MIXED HYPERLIPIDEMIA: ICD-10-CM

## 2024-01-09 DIAGNOSIS — I48.91 ATRIAL FIBRILLATION, UNSPECIFIED TYPE (MULTI): ICD-10-CM

## 2024-01-09 RX ORDER — POTASSIUM CHLORIDE 1500 MG/1
40 TABLET, EXTENDED RELEASE ORAL DAILY
Qty: 180 TABLET | Refills: 3 | Status: SHIPPED | OUTPATIENT
Start: 2024-01-09 | End: 2024-01-17 | Stop reason: SDUPTHER

## 2024-01-09 RX ORDER — DOFETILIDE 0.5 MG/1
500 CAPSULE ORAL 2 TIMES DAILY
Qty: 180 CAPSULE | Refills: 3 | Status: SHIPPED | OUTPATIENT
Start: 2024-01-09 | End: 2025-01-08

## 2024-01-09 RX ORDER — APIXABAN 5 MG/1
5 TABLET, FILM COATED ORAL 2 TIMES DAILY
Qty: 180 TABLET | Refills: 3 | Status: SHIPPED | OUTPATIENT
Start: 2024-01-09 | End: 2025-01-08

## 2024-01-09 RX ORDER — DILTIAZEM HYDROCHLORIDE 240 MG/1
240 CAPSULE, COATED, EXTENDED RELEASE ORAL DAILY
Qty: 90 CAPSULE | Refills: 3 | Status: SHIPPED | OUTPATIENT
Start: 2024-01-09 | End: 2025-01-08

## 2024-01-09 RX ORDER — ROSUVASTATIN CALCIUM 40 MG/1
40 TABLET, COATED ORAL NIGHTLY
Qty: 90 TABLET | Refills: 3 | Status: SHIPPED | OUTPATIENT
Start: 2024-01-09 | End: 2025-01-08

## 2024-01-10 ENCOUNTER — HOSPITAL ENCOUNTER (OUTPATIENT)
Dept: VASCULAR SURGERY | Facility: HOSPITAL | Age: 79
Discharge: HOME | End: 2024-01-12
Attending: FAMILY MEDICINE
Payer: MEDICARE

## 2024-01-10 DIAGNOSIS — I73.9 PERIPHERAL VASCULAR DISEASE, UNSPECIFIED (HCC): ICD-10-CM

## 2024-01-10 PROCEDURE — 93922 UPR/L XTREMITY ART 2 LEVELS: CPT

## 2024-01-11 LAB
VAS LEFT ABI: 0.9
VAS LEFT DORSALIS PEDIS BP: 98 MMHG
VAS LEFT PTA BP: 118 MMHG
VAS LEFT TBI: 0
VAS LEFT TOE PRESSURE: 0 MMHG
VAS RIGHT ABI: 1.17
VAS RIGHT ARM BP: 131 MMHG
VAS RIGHT DORSALIS PEDIS BP: 138 MMHG
VAS RIGHT PTA BP: 153 MMHG
VAS RIGHT TBI: 0.81
VAS RIGHT TOE PRESSURE: 106 MMHG

## 2024-01-11 PROCEDURE — 93922 UPR/L XTREMITY ART 2 LEVELS: CPT

## 2024-01-17 RX ORDER — POTASSIUM CHLORIDE 20 MEQ/1
40 TABLET, EXTENDED RELEASE ORAL DAILY
Qty: 180 TABLET | Refills: 3 | Status: SHIPPED | OUTPATIENT
Start: 2024-01-17 | End: 2024-01-23 | Stop reason: SDUPTHER

## 2024-01-23 DIAGNOSIS — I10 ESSENTIAL HYPERTENSION, BENIGN: ICD-10-CM

## 2024-01-23 RX ORDER — POTASSIUM CHLORIDE 20 MEQ/1
40 TABLET, EXTENDED RELEASE ORAL DAILY
Qty: 180 TABLET | Refills: 3 | Status: SHIPPED | OUTPATIENT
Start: 2024-01-23 | End: 2025-01-22

## 2024-01-23 NOTE — TELEPHONE ENCOUNTER
[facility] sent faxed stating will need new prescription for Potassium due to brand only not available generic micro available.

## 2024-06-26 ENCOUNTER — APPOINTMENT (OUTPATIENT)
Dept: CARDIOLOGY | Facility: CLINIC | Age: 79
End: 2024-06-26
Payer: MEDICARE

## 2024-08-02 ENCOUNTER — HOSPITAL ENCOUNTER (OUTPATIENT)
Facility: HOSPITAL | Age: 79
Setting detail: RECURRING SERIES
Discharge: HOME | End: 2024-08-05
Payer: MEDICARE

## 2024-08-02 VITALS — BODY MASS INDEX: 34.44 KG/M2 | HEIGHT: 75 IN | WEIGHT: 277 LBS

## 2024-08-02 PROCEDURE — 93798 PHYS/QHP OP CAR RHAB W/ECG: CPT

## 2024-08-02 PROCEDURE — 93797 PHYS/QHP OP CAR RHAB WO ECG: CPT

## 2024-08-02 RX ORDER — VIBEGRON 75 MG/1
75 TABLET, FILM COATED ORAL DAILY
COMMUNITY

## 2024-08-02 RX ORDER — LEVOTHYROXINE SODIUM 0.1 MG/1
100 TABLET ORAL DAILY
COMMUNITY

## 2024-08-02 ASSESSMENT — LIFESTYLE VARIABLES
ALCOHOL_AMOUNT: 1-2
ALCOHOL_USE: WEEKLY
ALCOHOL_TYPE: BEER

## 2024-08-02 ASSESSMENT — EXERCISE STRESS TEST
PEAK_BP: 180/77
PEAK_RPE: 11
PEAK_BP: 180/77
PEAK_HR: 128
PEAK_BP: 180/77
PEAK_RPE: 11
PEAK_METS: 2.4
PEAK_HR: 128

## 2024-08-02 ASSESSMENT — PATIENT HEALTH QUESTIONNAIRE - PHQ9
1. LITTLE INTEREST OR PLEASURE IN DOING THINGS: NOT AT ALL
SUM OF ALL RESPONSES TO PHQ QUESTIONS 1-9: 0
5. POOR APPETITE OR OVEREATING: NOT AT ALL
10. IF YOU CHECKED OFF ANY PROBLEMS, HOW DIFFICULT HAVE THESE PROBLEMS MADE IT FOR YOU TO DO YOUR WORK, TAKE CARE OF THINGS AT HOME, OR GET ALONG WITH OTHER PEOPLE: NOT DIFFICULT AT ALL
SUM OF ALL RESPONSES TO PHQ QUESTIONS 1-9: 0
SUM OF ALL RESPONSES TO PHQ9 QUESTIONS 1 & 2: 0
7. TROUBLE CONCENTRATING ON THINGS, SUCH AS READING THE NEWSPAPER OR WATCHING TELEVISION: NOT AT ALL
4. FEELING TIRED OR HAVING LITTLE ENERGY: NOT AT ALL
SUM OF ALL RESPONSES TO PHQ QUESTIONS 1-9: 0
9. THOUGHTS THAT YOU WOULD BE BETTER OFF DEAD, OR OF HURTING YOURSELF: NOT AT ALL
3. TROUBLE FALLING OR STAYING ASLEEP: NOT AT ALL
8. MOVING OR SPEAKING SO SLOWLY THAT OTHER PEOPLE COULD HAVE NOTICED. OR THE OPPOSITE, BEING SO FIGETY OR RESTLESS THAT YOU HAVE BEEN MOVING AROUND A LOT MORE THAN USUAL: NOT AT ALL
6. FEELING BAD ABOUT YOURSELF - OR THAT YOU ARE A FAILURE OR HAVE LET YOURSELF OR YOUR FAMILY DOWN: NOT AT ALL
SUM OF ALL RESPONSES TO PHQ QUESTIONS 1-9: 0
2. FEELING DOWN, DEPRESSED OR HOPELESS: NOT AT ALL

## 2024-08-02 ASSESSMENT — EJECTION FRACTION: EF_VALUE: 30

## 2024-08-02 NOTE — CARDIO/PULMONARY
INTAKE APPOINTMENT NOTE  2024    NAME: Luis A Snatos : 1945 AGE: 78 y.o.  GENDER: male    CARDIAC REHAB ADMITTING DIAGNOSIS: Chronic systolic (congestive) heart failure [I50.22]    REFERRING PHYSICIAN: Josh Ng MD    MEDICAL HX:  Past Medical History:   Diagnosis Date    Arrhythmia     had afib during covid was cardioverted and then ablation    Arthritis     knees, hands, shoulders    CAD (coronary artery disease)     COVID-19 2020    Family history of colon cancer     Paternal grandmother    Heart failure (HCC)     History of colon polyps     Hyperlipidemia     Hypertension     Sleep apnea     bipap    Thyroid disease     hypothyroidism         ANTHROPOMETRICS:      Ht Readings from Last 1 Encounters:   24 1.905 m (6' 3\")      Wt Readings from Last 1 Encounters:   24 125.6 kg (277 lb)        WAIST: 50       VISIT SUMMARY:    Luis A Santos 78 y.o. presented to Cardiac Rehab for program orientation and 6 minute walk test today with a primary diagnosis of Chronic systolic (congestive) heart failure [I50.22]. EF is 30 % (23) Cardiac risk factors include family history, dyslipidemia, obesity, hypertension. Patient is not a smoker. Luis A Santos lives with his wife and reports having good social support. Patient was evaluated for depression using the PHQ-9 assessment tool with a result of 0 which is considered mild. The result was discussed with patient. Patient denied chest pain or SOB during 6 minute walk test and was in SR/ST, V pacing, few PVCs. Patient walked 0.15 mi at a speed of 1.8 mph and grade of 0 % for a final MET level of 2.4. Exercise prescription developed using exercise tolerance results and patient stated goals, to be supplemented with home exercise recommendations. Education manual given to patient and reviewed. Patient will attend cardiac rehab 2-3 times/week over 18 weeks which will include both exercise and education sessions.    Patient states that

## 2024-08-06 ENCOUNTER — TELEPHONE (OUTPATIENT)
Dept: CARDIOLOGY | Facility: CLINIC | Age: 79
End: 2024-08-06
Payer: MEDICARE

## 2024-08-06 ENCOUNTER — HOSPITAL ENCOUNTER (OUTPATIENT)
Facility: HOSPITAL | Age: 79
Setting detail: RECURRING SERIES
Discharge: HOME | End: 2024-08-09
Payer: MEDICARE

## 2024-08-06 VITALS — BODY MASS INDEX: 34.12 KG/M2 | WEIGHT: 273 LBS

## 2024-08-06 DIAGNOSIS — I25.10 ATHEROSCLEROSIS OF NATIVE CORONARY ARTERY OF NATIVE HEART WITHOUT ANGINA PECTORIS: ICD-10-CM

## 2024-08-06 DIAGNOSIS — E78.2 MIXED HYPERLIPIDEMIA: Primary | ICD-10-CM

## 2024-08-06 PROCEDURE — 93798 PHYS/QHP OP CAR RHAB W/ECG: CPT

## 2024-08-06 ASSESSMENT — EXERCISE STRESS TEST
PEAK_RPE: 31
PEAK_METS: 2.4
PEAK_HR: 127

## 2024-08-06 NOTE — TELEPHONE ENCOUNTER
Patient called for lab order prior to his visit pending 8/23/2024. To Batsheva Gonzalez NP for orders/approval    Would like sent to [facility]

## 2024-08-08 ENCOUNTER — HOSPITAL ENCOUNTER (OUTPATIENT)
Facility: HOSPITAL | Age: 79
Setting detail: RECURRING SERIES
Discharge: HOME | End: 2024-08-11
Payer: MEDICARE

## 2024-08-08 VITALS — WEIGHT: 275 LBS | BODY MASS INDEX: 34.37 KG/M2

## 2024-08-08 PROCEDURE — 93798 PHYS/QHP OP CAR RHAB W/ECG: CPT

## 2024-08-08 ASSESSMENT — EXERCISE STRESS TEST
PEAK_METS: 2.2
PEAK_HR: 116
PEAK_RPE: 11

## 2024-08-13 ENCOUNTER — HOSPITAL ENCOUNTER (OUTPATIENT)
Facility: HOSPITAL | Age: 79
Setting detail: RECURRING SERIES
Discharge: HOME | End: 2024-08-16
Payer: MEDICARE

## 2024-08-13 VITALS — BODY MASS INDEX: 34.5 KG/M2 | WEIGHT: 276 LBS

## 2024-08-13 PROCEDURE — 93798 PHYS/QHP OP CAR RHAB W/ECG: CPT

## 2024-08-13 ASSESSMENT — EXERCISE STRESS TEST
PEAK_RPE: 11-12
PEAK_METS: 2.6
PEAK_HR: 90

## 2024-08-15 ENCOUNTER — APPOINTMENT (OUTPATIENT)
Facility: HOSPITAL | Age: 79
End: 2024-08-15
Payer: MEDICARE

## 2024-08-19 LAB
NON-UH HIE ALANINE AMINOTRANSFERASE:CCNC:PT:SER/PLAS:QN:NO ADDITION OF P-5': 18 INT._UNIT/L (ref 6–46)
NON-UH HIE ANION GAP:SCNC:PT:SER/PLAS:QN:: 11 MEQ/L (ref 6–16)
NON-UH HIE ASPARTATE AMINOTRANSFERASE:CCNC:PT:SER/PLAS:QN:: 22 INT._UNIT/L (ref 5–43)
NON-UH HIE CALCIUM:MCNC:PT:SER/PLAS:QN:: 8.8 MG/DL (ref 8.9–11.1)
NON-UH HIE CARBON DIOXIDE:SCNC:PT:SER/PLAS:QN:: 27 MMOL/L (ref 21–31)
NON-UH HIE CHLORIDE:SCNC:PT:SER/PLAS:QN:: 103 MMOL/L (ref 101–111)
NON-UH HIE CHOLESTEROL.IN HDL:MCNC:PT:SER/PLAS:QN:: 46 MG/DL
NON-UH HIE CHOLESTEROL.IN LDL:MCNC:PT:SER/PLAS:QN:: 63 MG/DL
NON-UH HIE CHOLESTEROL.IN VLDL:MCNC:PT:SER/PLAS:QN:CALCULATED: 20 MG/DL (ref 7–40)
NON-UH HIE CHOLESTEROL:MCNC:PT:SER/PLAS:QN:: 120 MG/DL (ref 120–200)
NON-UH HIE CREATININE:MCNC:PT:SER/PLAS:QN:: 0.7 MG/DL (ref 0.5–1.3)
NON-UH HIE EGFR: 95 ML/MIN/1.73 M2
NON-UH HIE GLUCOSE:MCNC:PT:SER/PLAS:QN:: 100 MG/DL (ref 55–199)
NON-UH HIE POTASSIUM:SCNC:PT:SER/PLAS:QN:: 3.7 MMOL/L (ref 3.5–5.3)
NON-UH HIE SODIUM:SCNC:PT:SER/PLAS:QN:: 137 MMOL/L (ref 135–145)
NON-UH HIE TRIGLYCERIDE:MCNC:PT:SER/PLAS:QN:: 99 MG/DL
NON-UH HIE UREA NITROGEN/CREATININE:MRTO:PT:SER/PLAS:QN:: 19 NO UNITS (ref 10–20)
NON-UH HIE UREA NITROGEN:MCNC:PT:SER/PLAS:QN:: 13 MG/DL (ref 5–21)

## 2024-08-20 ENCOUNTER — HOSPITAL ENCOUNTER (OUTPATIENT)
Facility: HOSPITAL | Age: 79
Setting detail: RECURRING SERIES
Discharge: HOME | End: 2024-08-23
Payer: MEDICARE

## 2024-08-20 VITALS — BODY MASS INDEX: 34.5 KG/M2 | WEIGHT: 276 LBS

## 2024-08-20 PROCEDURE — 93798 PHYS/QHP OP CAR RHAB W/ECG: CPT

## 2024-08-20 ASSESSMENT — EXERCISE STRESS TEST
PEAK_METS: 2.6
PEAK_HR: 92

## 2024-08-22 ENCOUNTER — HOSPITAL ENCOUNTER (OUTPATIENT)
Facility: HOSPITAL | Age: 79
Setting detail: RECURRING SERIES
Discharge: HOME | End: 2024-08-25
Payer: MEDICARE

## 2024-08-23 ENCOUNTER — APPOINTMENT (OUTPATIENT)
Dept: CARDIOLOGY | Facility: CLINIC | Age: 79
End: 2024-08-23
Payer: MEDICARE

## 2024-08-23 VITALS
HEART RATE: 56 BPM | HEIGHT: 72 IN | DIASTOLIC BLOOD PRESSURE: 78 MMHG | BODY MASS INDEX: 28.44 KG/M2 | SYSTOLIC BLOOD PRESSURE: 134 MMHG | WEIGHT: 210 LBS

## 2024-08-23 DIAGNOSIS — I25.10 ATHEROSCLEROSIS OF NATIVE CORONARY ARTERY OF NATIVE HEART WITHOUT ANGINA PECTORIS: Primary | ICD-10-CM

## 2024-08-23 DIAGNOSIS — I10 ESSENTIAL HYPERTENSION, BENIGN: ICD-10-CM

## 2024-08-23 DIAGNOSIS — G47.33 OSA ON CPAP: ICD-10-CM

## 2024-08-23 DIAGNOSIS — E78.2 MIXED HYPERLIPIDEMIA: ICD-10-CM

## 2024-08-23 DIAGNOSIS — I48.91 ATRIAL FIBRILLATION, UNSPECIFIED TYPE (MULTI): ICD-10-CM

## 2024-08-23 PROCEDURE — 1160F RVW MEDS BY RX/DR IN RCRD: CPT | Performed by: INTERNAL MEDICINE

## 2024-08-23 PROCEDURE — 3075F SYST BP GE 130 - 139MM HG: CPT | Performed by: INTERNAL MEDICINE

## 2024-08-23 PROCEDURE — 1159F MED LIST DOCD IN RCRD: CPT | Performed by: INTERNAL MEDICINE

## 2024-08-23 PROCEDURE — 99214 OFFICE O/P EST MOD 30 MIN: CPT | Performed by: INTERNAL MEDICINE

## 2024-08-23 PROCEDURE — 93000 ELECTROCARDIOGRAM COMPLETE: CPT | Performed by: INTERNAL MEDICINE

## 2024-08-23 PROCEDURE — 1036F TOBACCO NON-USER: CPT | Performed by: INTERNAL MEDICINE

## 2024-08-23 PROCEDURE — 3078F DIAST BP <80 MM HG: CPT | Performed by: INTERNAL MEDICINE

## 2024-08-23 NOTE — LETTER
August 23, 2024     Arnie Woo DO  [address]  [facility] [address]    Patient: Robinson Resendiz   YOB: 1945   Date of Visit: 8/23/2024       Dear Dr. Arnie Woo DO:    Thank you for referring Robinson Resendiz to me for evaluation. Below are my notes for this consultation.  If you have questions, please do not hesitate to call me. I look forward to following your patient along with you.       Sincerely,     Tammy Diaz MD      CC: No Recipients  ______________________________________________________________________________________    Subjective   Robinson Resendiz is a 78 y.o. male       Chief Complaint    Follow-up          HPI        Patient is here for follow-up continue management for coronary artery disease with prior PCI to the LAD, hypertension, hyperlipidemia and atrial fibrillation.  Since last time I saw him he denies any cardiac complaint of chest pain, palpitation, lightheadedness, dizziness or syncope.  He remains active.  His EKG showed mild sinus bradycardia with normal QTc interval . recent laboratory data noted and reviewed with him.  ASSESSMENT:      1. Coronary artery disease with remote PCI to the LAD. No chest pain. Last cardiac catheterization in [place] showed no residual disease. Continues describe functional class I  2. Hypertension, controlled.  3. Hyperlipidemia, controlled   4. Mildly overweight. With a recent pound weight loss  5. Previous history of hypokalemia, resolved.  6. Persistent t atrial fibrillation status post prior cardioversion currently in normal sinus rhythm on dofetilide tolerating that well. QTc interval is acceptable. He is on long-term anticoagulation  7. Long-term anticoagulation due to atrial fibrillation tolerating that well  8. leep apnea on CPAP    RECOMMENDATION:      1. The patient was advised to continue present medical therapy  2. The patient will be seen back in the office when he comes back from [place]  3. I reviewed with him his recent  lab with him  4. Patient was advised to notify me change in cardiac status or symptoms     Review of Systems   All other systems reviewed and are negative.           Vitals:    08/23/24 1131   BP: 134/78   BP Location: Left arm   Patient Position: Sitting   Pulse: 56   Weight: 95.3 kg (210 lb)   Height: 1.829 m (6')        Objective   Physical Exam  Constitutional:       Appearance: Normal appearance.   HENT:      Nose: Nose normal.   Neck:      Vascular: No carotid bruit.   Cardiovascular:      Rate and Rhythm: Normal rate.      Pulses: Normal pulses.      Heart sounds: Normal heart sounds.   Pulmonary:      Effort: Pulmonary effort is normal.   Abdominal:      General: Bowel sounds are normal.      Palpations: Abdomen is soft.   Musculoskeletal:         General: Normal range of motion.      Cervical back: Normal range of motion.      Right lower leg: No edema.      Left lower leg: No edema.   Skin:     General: Skin is warm and dry.   Neurological:      General: No focal deficit present.      Mental Status: He is alert.   Psychiatric:         Mood and Affect: Mood normal.         Behavior: Behavior normal.         Thought Content: Thought content normal.         Judgment: Judgment normal.         Allergies  Penicillins     Current Medications    Current Outpatient Medications:   •  aspirin 81 mg EC tablet, Take 1 tablet (81 mg) by mouth. 1 tablet twice weekly, Disp: , Rfl:   •  dilTIAZem CD (Cardizem CD) 240 mg 24 hr capsule, Take 1 capsule (240 mg) by mouth once daily., Disp: 90 capsule, Rfl: 3  •  dofetilide (Tikosyn) 500 mcg capsule, Take 1 capsule (500 mcg) by mouth 2 times a day., Disp: 180 capsule, Rfl: 3  •  Eliquis 5 mg tablet, Take 1 tablet (5 mg) by mouth 2 times a day., Disp: 180 tablet, Rfl: 3  •  nitroglycerin (Nitrostat) 0.4 mg SL tablet, Place 1 tablet (0.4 mg) under the tongue every 5 minutes if needed for chest pain., Disp: , Rfl:   •  potassium chloride CR (Klor-Con M20) 20 mEq ER tablet, Take 2  tablets (40 mEq) by mouth once daily., Disp: 180 tablet, Rfl: 3  •  rosuvastatin (Crestor) 40 mg tablet, Take 1 tablet (40 mg) by mouth once daily at bedtime., Disp: 90 tablet, Rfl: 3                     Assessment/Plan   1. Atherosclerosis of native coronary artery of native heart without angina pectoris        2. Atrial fibrillation, unspecified type (Multi)  Follow Up In Cardiology      3. Essential hypertension, benign        4. Mixed hyperlipidemia        5. BMI 28.0-28.9,adult        6. YESSENIA on CPAP                 Scribe Attestation  By signing my name below, I, Juliana IRWIN LPN   , Scribe   attest that this documentation has been prepared under the direction and in the presence of Tammy Diaz MD.     Provider Attestation - Scribe documentation    All medical record entries made by the Scribe were at my direction and personally dictated by me. I have reviewed the chart and agree that the record accurately reflects my personal performance of the history, physical exam, discussion and plan.

## 2024-08-23 NOTE — PROGRESS NOTES
Subjective   Robinson Resendiz is a 78 y.o. male       Chief Complaint    Follow-up          HPI        Patient is here for follow-up continue management for coronary artery disease with prior PCI to the LAD, hypertension, hyperlipidemia and atrial fibrillation.  Since last time I saw him he denies any cardiac complaint of chest pain, palpitation, lightheadedness, dizziness or syncope.  He remains active.  His EKG showed mild sinus bradycardia with normal QTc interval . recent laboratory data noted and reviewed with him.  ASSESSMENT:      1. Coronary artery disease with remote PCI to the LAD. No chest pain. Last cardiac catheterization in [place] showed no residual disease. Continues describe functional class I  2. Hypertension, controlled.  3. Hyperlipidemia, controlled   4. Mildly overweight. With a recent pound weight loss  5. Previous history of hypokalemia, resolved.  6. Persistent t atrial fibrillation status post prior cardioversion currently in normal sinus rhythm on dofetilide tolerating that well. QTc interval is acceptable. He is on long-term anticoagulation  7. Long-term anticoagulation due to atrial fibrillation tolerating that well  8. leep apnea on CPAP    RECOMMENDATION:      1. The patient was advised to continue present medical therapy  2. The patient will be seen back in the office when he comes back from [place]  3. I reviewed with him his recent lab with him  4. Patient was advised to notify me change in cardiac status or symptoms     Review of Systems   All other systems reviewed and are negative.           Vitals:    08/23/24 1131   BP: 134/78   BP Location: Left arm   Patient Position: Sitting   Pulse: 56   Weight: 95.3 kg (210 lb)   Height: 1.829 m (6')        Objective   Physical Exam  Constitutional:       Appearance: Normal appearance.   HENT:      Nose: Nose normal.   Neck:      Vascular: No carotid bruit.   Cardiovascular:      Rate and Rhythm: Normal rate.      Pulses: Normal pulses.       Heart sounds: Normal heart sounds.   Pulmonary:      Effort: Pulmonary effort is normal.   Abdominal:      General: Bowel sounds are normal.      Palpations: Abdomen is soft.   Musculoskeletal:         General: Normal range of motion.      Cervical back: Normal range of motion.      Right lower leg: No edema.      Left lower leg: No edema.   Skin:     General: Skin is warm and dry.   Neurological:      General: No focal deficit present.      Mental Status: He is alert.   Psychiatric:         Mood and Affect: Mood normal.         Behavior: Behavior normal.         Thought Content: Thought content normal.         Judgment: Judgment normal.         Allergies  Penicillins     Current Medications    Current Outpatient Medications:     aspirin 81 mg EC tablet, Take 1 tablet (81 mg) by mouth. 1 tablet twice weekly, Disp: , Rfl:     dilTIAZem CD (Cardizem CD) 240 mg 24 hr capsule, Take 1 capsule (240 mg) by mouth once daily., Disp: 90 capsule, Rfl: 3    dofetilide (Tikosyn) 500 mcg capsule, Take 1 capsule (500 mcg) by mouth 2 times a day., Disp: 180 capsule, Rfl: 3    Eliquis 5 mg tablet, Take 1 tablet (5 mg) by mouth 2 times a day., Disp: 180 tablet, Rfl: 3    nitroglycerin (Nitrostat) 0.4 mg SL tablet, Place 1 tablet (0.4 mg) under the tongue every 5 minutes if needed for chest pain., Disp: , Rfl:     potassium chloride CR (Klor-Con M20) 20 mEq ER tablet, Take 2 tablets (40 mEq) by mouth once daily., Disp: 180 tablet, Rfl: 3    rosuvastatin (Crestor) 40 mg tablet, Take 1 tablet (40 mg) by mouth once daily at bedtime., Disp: 90 tablet, Rfl: 3                     Assessment/Plan   1. Atherosclerosis of native coronary artery of native heart without angina pectoris        2. Atrial fibrillation, unspecified type (Multi)  Follow Up In Cardiology      3. Essential hypertension, benign        4. Mixed hyperlipidemia        5. BMI 28.0-28.9,adult        6. YESSENIA on CPAP                 Scribe Attestation  By signing my name below,  I, Juliana IRWIN LPN   , Scribe   attest that this documentation has been prepared under the direction and in the presence of Tammy Diaz MD.     Provider Attestation - Scribe documentation    All medical record entries made by the Scribe were at my direction and personally dictated by me. I have reviewed the chart and agree that the record accurately reflects my personal performance of the history, physical exam, discussion and plan.

## 2024-08-27 ENCOUNTER — HOSPITAL ENCOUNTER (OUTPATIENT)
Facility: HOSPITAL | Age: 79
Setting detail: RECURRING SERIES
Discharge: HOME | End: 2024-08-30
Payer: MEDICARE

## 2024-08-27 VITALS — BODY MASS INDEX: 34.62 KG/M2 | WEIGHT: 277 LBS

## 2024-08-27 PROCEDURE — 93798 PHYS/QHP OP CAR RHAB W/ECG: CPT

## 2024-08-27 ASSESSMENT — LIFESTYLE VARIABLES
ALCOHOL_TYPE: BEER
SMOKELESS_TOBACCO: NO
ALCOHOL_USE: WEEKLY
ALCOHOL_AMOUNT: 1-2

## 2024-08-27 ASSESSMENT — EXERCISE STRESS TEST
PEAK_RPE: 12
PEAK_HR: 85
PEAK_METS: 2.6

## 2024-08-27 ASSESSMENT — EJECTION FRACTION: EF_VALUE: 30

## 2024-08-29 ENCOUNTER — HOSPITAL ENCOUNTER (OUTPATIENT)
Facility: HOSPITAL | Age: 79
Setting detail: RECURRING SERIES
End: 2024-08-29
Payer: MEDICARE

## 2024-08-29 VITALS — WEIGHT: 276 LBS | BODY MASS INDEX: 34.5 KG/M2

## 2024-08-29 PROCEDURE — 93798 PHYS/QHP OP CAR RHAB W/ECG: CPT

## 2024-08-29 ASSESSMENT — EXERCISE STRESS TEST
PEAK_RPE: 12
PEAK_METS: 2.6
PEAK_HR: 88

## 2024-09-03 ENCOUNTER — HOSPITAL ENCOUNTER (OUTPATIENT)
Facility: HOSPITAL | Age: 79
Setting detail: RECURRING SERIES
Discharge: HOME | End: 2024-09-06
Payer: MEDICARE

## 2024-09-03 VITALS — WEIGHT: 276 LBS | BODY MASS INDEX: 34.5 KG/M2

## 2024-09-03 PROCEDURE — 93798 PHYS/QHP OP CAR RHAB W/ECG: CPT

## 2024-09-03 ASSESSMENT — EXERCISE STRESS TEST
PEAK_METS: 2.6
PEAK_RPE: 12
PEAK_HR: 131

## 2024-09-05 ENCOUNTER — HOSPITAL ENCOUNTER (OUTPATIENT)
Facility: HOSPITAL | Age: 79
Setting detail: RECURRING SERIES
Discharge: HOME | End: 2024-09-08
Payer: MEDICARE

## 2024-09-05 VITALS — BODY MASS INDEX: 34.5 KG/M2 | WEIGHT: 276 LBS

## 2024-09-05 PROCEDURE — 93798 PHYS/QHP OP CAR RHAB W/ECG: CPT

## 2024-09-05 ASSESSMENT — EXERCISE STRESS TEST
PEAK_HR: 94
PEAK_METS: 2.6
PEAK_RPE: 12

## 2024-09-10 ENCOUNTER — HOSPITAL ENCOUNTER (OUTPATIENT)
Facility: HOSPITAL | Age: 79
Setting detail: RECURRING SERIES
Discharge: HOME | End: 2024-09-13
Payer: MEDICARE

## 2024-09-10 VITALS — BODY MASS INDEX: 34.5 KG/M2 | WEIGHT: 276 LBS

## 2024-09-10 PROCEDURE — 93798 PHYS/QHP OP CAR RHAB W/ECG: CPT

## 2024-09-10 ASSESSMENT — EXERCISE STRESS TEST
PEAK_METS: 2.6
PEAK_HR: 96
PEAK_RPE: 12

## 2024-09-13 ENCOUNTER — HOSPITAL ENCOUNTER (OUTPATIENT)
Facility: HOSPITAL | Age: 79
Setting detail: RECURRING SERIES
Discharge: HOME | End: 2024-09-16
Payer: MEDICARE

## 2024-09-13 VITALS — BODY MASS INDEX: 34.5 KG/M2 | WEIGHT: 276 LBS

## 2024-09-13 PROCEDURE — 93798 PHYS/QHP OP CAR RHAB W/ECG: CPT

## 2024-09-13 ASSESSMENT — EXERCISE STRESS TEST
PEAK_RPE: 12
PEAK_METS: 2.6
PEAK_HR: 100

## 2024-09-17 ENCOUNTER — HOSPITAL ENCOUNTER (OUTPATIENT)
Facility: HOSPITAL | Age: 79
Setting detail: RECURRING SERIES
Discharge: HOME | End: 2024-09-20
Payer: MEDICARE

## 2024-09-17 VITALS — BODY MASS INDEX: 34.62 KG/M2 | WEIGHT: 277 LBS

## 2024-09-17 PROCEDURE — 93798 PHYS/QHP OP CAR RHAB W/ECG: CPT

## 2024-09-17 ASSESSMENT — EXERCISE STRESS TEST
PEAK_RPE: 12
PEAK_HR: 100
PEAK_METS: 2.6

## 2024-09-19 ENCOUNTER — HOSPITAL ENCOUNTER (OUTPATIENT)
Facility: HOSPITAL | Age: 79
Setting detail: RECURRING SERIES
Discharge: HOME | End: 2024-09-22
Payer: MEDICARE

## 2024-09-19 VITALS — WEIGHT: 276 LBS | BODY MASS INDEX: 34.5 KG/M2

## 2024-09-19 PROCEDURE — 93798 PHYS/QHP OP CAR RHAB W/ECG: CPT

## 2024-09-19 ASSESSMENT — EXERCISE STRESS TEST
PEAK_HR: 116
PEAK_METS: 2.6
PEAK_RPE: 12

## 2024-09-24 ENCOUNTER — APPOINTMENT (OUTPATIENT)
Facility: HOSPITAL | Age: 79
End: 2024-09-24
Payer: MEDICARE

## 2024-09-26 ENCOUNTER — APPOINTMENT (OUTPATIENT)
Facility: HOSPITAL | Age: 79
End: 2024-09-26
Payer: MEDICARE

## 2024-10-01 ENCOUNTER — HOSPITAL ENCOUNTER (OUTPATIENT)
Facility: HOSPITAL | Age: 79
Setting detail: RECURRING SERIES
Discharge: HOME | End: 2024-10-04
Payer: MEDICARE

## 2024-10-01 VITALS — WEIGHT: 277 LBS | BODY MASS INDEX: 34.62 KG/M2

## 2024-10-01 PROCEDURE — 93798 PHYS/QHP OP CAR RHAB W/ECG: CPT

## 2024-10-01 ASSESSMENT — EXERCISE STRESS TEST: PEAK_RPE: 12

## 2024-10-03 ENCOUNTER — HOSPITAL ENCOUNTER (OUTPATIENT)
Facility: HOSPITAL | Age: 79
Setting detail: RECURRING SERIES
Discharge: HOME | End: 2024-10-06
Payer: MEDICARE

## 2024-10-03 VITALS — BODY MASS INDEX: 34.37 KG/M2 | WEIGHT: 275 LBS

## 2024-10-03 PROCEDURE — 93798 PHYS/QHP OP CAR RHAB W/ECG: CPT

## 2024-10-03 ASSESSMENT — EXERCISE STRESS TEST
PEAK_METS: 2.6
PEAK_HR: 127

## 2024-10-08 ENCOUNTER — APPOINTMENT (OUTPATIENT)
Facility: HOSPITAL | Age: 79
End: 2024-10-08
Payer: MEDICARE

## 2024-10-10 ENCOUNTER — APPOINTMENT (OUTPATIENT)
Facility: HOSPITAL | Age: 79
End: 2024-10-10
Payer: MEDICARE

## 2024-10-10 ENCOUNTER — HOSPITAL ENCOUNTER (OUTPATIENT)
Facility: HOSPITAL | Age: 79
Setting detail: RECURRING SERIES
Discharge: HOME | End: 2024-10-13
Payer: MEDICARE

## 2024-10-10 VITALS — BODY MASS INDEX: 34.37 KG/M2 | WEIGHT: 275 LBS

## 2024-10-10 PROCEDURE — 93798 PHYS/QHP OP CAR RHAB W/ECG: CPT

## 2024-10-10 ASSESSMENT — EXERCISE STRESS TEST
PEAK_HR: 125
PEAK_RPE: 12
PEAK_METS: 2.6

## 2024-10-14 ENCOUNTER — HOSPITAL ENCOUNTER (OUTPATIENT)
Facility: HOSPITAL | Age: 79
Setting detail: RECURRING SERIES
Discharge: HOME | End: 2024-10-17
Payer: MEDICARE

## 2024-10-14 VITALS — BODY MASS INDEX: 34.62 KG/M2 | WEIGHT: 277 LBS

## 2024-10-14 PROCEDURE — 93798 PHYS/QHP OP CAR RHAB W/ECG: CPT

## 2024-10-14 ASSESSMENT — EXERCISE STRESS TEST
PEAK_RPE: 12
PEAK_HR: 128
PEAK_METS: 2.6

## 2024-10-17 ENCOUNTER — HOSPITAL ENCOUNTER (OUTPATIENT)
Facility: HOSPITAL | Age: 79
Setting detail: RECURRING SERIES
Discharge: HOME | End: 2024-10-20
Payer: MEDICARE

## 2024-10-17 VITALS — WEIGHT: 276 LBS | BODY MASS INDEX: 34.5 KG/M2

## 2024-10-17 PROCEDURE — 93798 PHYS/QHP OP CAR RHAB W/ECG: CPT

## 2024-10-17 PROCEDURE — 93797 PHYS/QHP OP CAR RHAB WO ECG: CPT

## 2024-10-17 ASSESSMENT — EXERCISE STRESS TEST
PEAK_RPE: 12
PEAK_HR: 114
PEAK_METS: 2.6

## 2024-10-21 ENCOUNTER — TRANSCRIBE ORDERS (OUTPATIENT)
Facility: HOSPITAL | Age: 79
End: 2024-10-21

## 2024-10-21 DIAGNOSIS — I42.9 PRIMARY CARDIOMYOPATHY (HCC): Primary | ICD-10-CM

## 2024-10-22 ENCOUNTER — HOSPITAL ENCOUNTER (OUTPATIENT)
Facility: HOSPITAL | Age: 79
Setting detail: RECURRING SERIES
Discharge: HOME | End: 2024-10-25
Payer: MEDICARE

## 2024-10-22 VITALS — WEIGHT: 276 LBS | BODY MASS INDEX: 34.5 KG/M2

## 2024-10-22 PROCEDURE — 93798 PHYS/QHP OP CAR RHAB W/ECG: CPT

## 2024-10-22 ASSESSMENT — EXERCISE STRESS TEST
PEAK_METS: 2.6
PEAK_RPE: 12
PEAK_HR: 98

## 2024-10-24 ENCOUNTER — HOSPITAL ENCOUNTER (OUTPATIENT)
Facility: HOSPITAL | Age: 79
Setting detail: RECURRING SERIES
Discharge: HOME | End: 2024-10-27
Payer: MEDICARE

## 2024-10-24 VITALS — BODY MASS INDEX: 34.25 KG/M2 | WEIGHT: 274 LBS

## 2024-10-24 PROCEDURE — 93798 PHYS/QHP OP CAR RHAB W/ECG: CPT

## 2024-10-24 PROCEDURE — 93797 PHYS/QHP OP CAR RHAB WO ECG: CPT

## 2024-10-24 ASSESSMENT — EXERCISE STRESS TEST
PEAK_RPE: 12
PEAK_HR: 97
PEAK_METS: 2.6

## 2024-10-24 NOTE — PROGRESS NOTES
Reconciliation, Ar   levothyroxine (SYNTHROID) 88 MCG tablet Take 100 mcg by mouth every morning (before breakfast)    Automatic Reconciliation, Ar   magnesium oxide (MAG-OX) 400 MG tablet Take 1 tablet by mouth daily    Automatic Reconciliation, Ar   sacubitril-valsartan (ENTRESTO) 24-26 MG per tablet Take 1 tablet by mouth 2 times daily    Automatic Reconciliation, Ar           ANTHROPOMETRICS:    Ht Readings from Last 1 Encounters:   08/02/24 1.905 m (6' 3\")      Wt Readings from Last 10 Encounters:   10/22/24 125.2 kg (276 lb)   10/17/24 125.2 kg (276 lb)   10/14/24 125.6 kg (277 lb)   10/10/24 124.7 kg (275 lb)   10/03/24 124.7 kg (275 lb)   10/01/24 125.6 kg (277 lb)   09/19/24 125.2 kg (276 lb)   09/17/24 125.6 kg (277 lb)   09/13/24 125.2 kg (276 lb)   09/10/24 125.2 kg (276 lb)      BMI Readings from Last 10 Encounters:   10/22/24 34.50 kg/m²   10/17/24 34.50 kg/m²   10/14/24 34.62 kg/m²   10/10/24 34.37 kg/m²   10/03/24 34.37 kg/m²   10/01/24 34.62 kg/m²   09/19/24 34.50 kg/m²   09/17/24 34.62 kg/m²   09/13/24 34.50 kg/m²   09/10/24 34.50 kg/m²      IBW:196 # +/- 10%  %IBW: 141 % +/- 10%    BMI Category: Obesity Class I  Waist (measured at intake): Waist Circumference (In): -- (50@intake)       Reported Wt Hx: His peak weight was 300#. He is down to 276# and maintains around this number. His goal weight is 225#.     Reported Diet Hx: Pt's main goal right now is weight loss. He has not started making changes yet towards this goal. Last year his wife was doing weight watchers and he lost weight following a similar plan as her. Pt states he has some habits that have been hard to break like snacking before bed, smaller portion sizes, and eating out. Pt does most of the cooking and grocery shopping.     Rate Your Plate Score:  Rate Your Plate Total Score: 55    Score 41-57: Some choices need improving   (Score 58-72: Making many healthy choices; 41-57: Some choices need improving 24-40: many choices need

## 2024-10-29 ENCOUNTER — HOSPITAL ENCOUNTER (OUTPATIENT)
Facility: HOSPITAL | Age: 79
Setting detail: RECURRING SERIES
Discharge: HOME | End: 2024-11-01
Payer: MEDICARE

## 2024-10-29 VITALS — BODY MASS INDEX: 34.37 KG/M2 | WEIGHT: 275 LBS

## 2024-10-29 PROCEDURE — 93798 PHYS/QHP OP CAR RHAB W/ECG: CPT

## 2024-10-29 ASSESSMENT — EXERCISE STRESS TEST
PEAK_HR: 107
PEAK_METS: 2.6
PEAK_RPE: 12

## 2024-10-31 ENCOUNTER — HOSPITAL ENCOUNTER (OUTPATIENT)
Facility: HOSPITAL | Age: 79
Setting detail: RECURRING SERIES
Discharge: HOME | End: 2024-11-03
Payer: MEDICARE

## 2024-10-31 VITALS — WEIGHT: 273 LBS | BODY MASS INDEX: 34.12 KG/M2

## 2024-10-31 PROCEDURE — 93798 PHYS/QHP OP CAR RHAB W/ECG: CPT

## 2024-10-31 ASSESSMENT — EXERCISE STRESS TEST
PEAK_HR: 105
PEAK_METS: 2.6
PEAK_RPE: 12

## 2024-11-05 ENCOUNTER — HOSPITAL ENCOUNTER (OUTPATIENT)
Facility: HOSPITAL | Age: 79
Setting detail: RECURRING SERIES
Discharge: HOME | End: 2024-11-08
Payer: MEDICARE

## 2024-11-05 VITALS — BODY MASS INDEX: 34.25 KG/M2 | WEIGHT: 274 LBS

## 2024-11-05 PROCEDURE — 93798 PHYS/QHP OP CAR RHAB W/ECG: CPT

## 2024-11-05 ASSESSMENT — EXERCISE STRESS TEST
PEAK_METS: 2.6
PEAK_RPE: 12
PEAK_HR: 100

## 2024-11-07 ENCOUNTER — HOSPITAL ENCOUNTER (OUTPATIENT)
Facility: HOSPITAL | Age: 79
Setting detail: RECURRING SERIES
Discharge: HOME | End: 2024-11-10
Payer: MEDICARE

## 2024-11-07 VITALS — BODY MASS INDEX: 34.25 KG/M2 | WEIGHT: 274 LBS

## 2024-11-07 PROCEDURE — 93798 PHYS/QHP OP CAR RHAB W/ECG: CPT

## 2024-11-07 ASSESSMENT — EXERCISE STRESS TEST
PEAK_RPE: 12
PEAK_METS: 2.6
PEAK_HR: 100

## 2024-11-12 ENCOUNTER — HOSPITAL ENCOUNTER (OUTPATIENT)
Facility: HOSPITAL | Age: 79
Setting detail: RECURRING SERIES
Discharge: HOME | End: 2024-11-15
Payer: MEDICARE

## 2024-11-12 VITALS — BODY MASS INDEX: 34.37 KG/M2 | WEIGHT: 275 LBS

## 2024-11-12 PROCEDURE — 93798 PHYS/QHP OP CAR RHAB W/ECG: CPT

## 2024-11-12 ASSESSMENT — EXERCISE STRESS TEST
PEAK_METS: 2.6
PEAK_HR: 113
PEAK_RPE: 12

## 2024-11-14 ENCOUNTER — HOSPITAL ENCOUNTER (OUTPATIENT)
Facility: HOSPITAL | Age: 79
Setting detail: RECURRING SERIES
Discharge: HOME | End: 2024-11-17
Payer: MEDICARE

## 2024-11-14 VITALS — WEIGHT: 275 LBS | BODY MASS INDEX: 34.37 KG/M2

## 2024-11-14 PROCEDURE — 93798 PHYS/QHP OP CAR RHAB W/ECG: CPT

## 2024-11-14 ASSESSMENT — EXERCISE STRESS TEST
PEAK_HR: 111
PEAK_RPE: 12
PEAK_METS: 2.8

## 2024-11-19 ENCOUNTER — HOSPITAL ENCOUNTER (OUTPATIENT)
Facility: HOSPITAL | Age: 79
Setting detail: RECURRING SERIES
Discharge: HOME | End: 2024-11-22
Payer: MEDICARE

## 2024-11-19 VITALS — WEIGHT: 276 LBS | BODY MASS INDEX: 34.5 KG/M2

## 2024-11-19 PROCEDURE — 93798 PHYS/QHP OP CAR RHAB W/ECG: CPT

## 2024-11-19 ASSESSMENT — EXERCISE STRESS TEST
PEAK_HR: 104
PEAK_METS: 2.8
PEAK_RPE: 12

## 2024-11-21 ENCOUNTER — HOSPITAL ENCOUNTER (OUTPATIENT)
Facility: HOSPITAL | Age: 79
Setting detail: RECURRING SERIES
Discharge: HOME | End: 2024-11-24
Payer: MEDICARE

## 2024-11-21 VITALS — WEIGHT: 276 LBS | BODY MASS INDEX: 34.5 KG/M2

## 2024-11-21 PROCEDURE — 93798 PHYS/QHP OP CAR RHAB W/ECG: CPT

## 2024-11-21 ASSESSMENT — EXERCISE STRESS TEST
PEAK_METS: 2.8
PEAK_HR: 114
PEAK_RPE: 12

## 2024-11-26 ENCOUNTER — HOSPITAL ENCOUNTER (OUTPATIENT)
Facility: HOSPITAL | Age: 79
Setting detail: RECURRING SERIES
Discharge: HOME | End: 2024-11-29
Payer: MEDICARE

## 2024-11-26 VITALS — BODY MASS INDEX: 34.62 KG/M2 | WEIGHT: 277 LBS

## 2024-11-26 PROCEDURE — 93798 PHYS/QHP OP CAR RHAB W/ECG: CPT

## 2024-11-26 ASSESSMENT — EXERCISE STRESS TEST
PEAK_HR: 113
PEAK_RPE: 12-13
PEAK_METS: 3.4
PEAK_BP: 177/74

## 2024-12-03 ENCOUNTER — HOSPITAL ENCOUNTER (OUTPATIENT)
Facility: HOSPITAL | Age: 79
Setting detail: RECURRING SERIES
Discharge: HOME | End: 2024-12-06
Payer: MEDICARE

## 2024-12-03 VITALS — WEIGHT: 275 LBS | BODY MASS INDEX: 34.37 KG/M2

## 2024-12-03 PROCEDURE — 93798 PHYS/QHP OP CAR RHAB W/ECG: CPT

## 2024-12-03 ASSESSMENT — PATIENT HEALTH QUESTIONNAIRE - PHQ9
5. POOR APPETITE OR OVEREATING: NOT AT ALL
9. THOUGHTS THAT YOU WOULD BE BETTER OFF DEAD, OR OF HURTING YOURSELF: NOT AT ALL
6. FEELING BAD ABOUT YOURSELF - OR THAT YOU ARE A FAILURE OR HAVE LET YOURSELF OR YOUR FAMILY DOWN: NOT AT ALL
SUM OF ALL RESPONSES TO PHQ QUESTIONS 1-9: 2
SUM OF ALL RESPONSES TO PHQ9 QUESTIONS 1 & 2: 0
2. FEELING DOWN, DEPRESSED OR HOPELESS: NOT AT ALL
4. FEELING TIRED OR HAVING LITTLE ENERGY: SEVERAL DAYS
8. MOVING OR SPEAKING SO SLOWLY THAT OTHER PEOPLE COULD HAVE NOTICED. OR THE OPPOSITE, BEING SO FIGETY OR RESTLESS THAT YOU HAVE BEEN MOVING AROUND A LOT MORE THAN USUAL: SEVERAL DAYS
7. TROUBLE CONCENTRATING ON THINGS, SUCH AS READING THE NEWSPAPER OR WATCHING TELEVISION: NOT AT ALL
SUM OF ALL RESPONSES TO PHQ QUESTIONS 1-9: 2
SUM OF ALL RESPONSES TO PHQ QUESTIONS 1-9: 2
10. IF YOU CHECKED OFF ANY PROBLEMS, HOW DIFFICULT HAVE THESE PROBLEMS MADE IT FOR YOU TO DO YOUR WORK, TAKE CARE OF THINGS AT HOME, OR GET ALONG WITH OTHER PEOPLE: SOMEWHAT DIFFICULT
3. TROUBLE FALLING OR STAYING ASLEEP: NOT AT ALL
SUM OF ALL RESPONSES TO PHQ QUESTIONS 1-9: 2
1. LITTLE INTEREST OR PLEASURE IN DOING THINGS: NOT AT ALL

## 2024-12-03 ASSESSMENT — EXERCISE STRESS TEST
PEAK_METS: 3.4
PEAK_HR: 109
PEAK_RPE: 13

## 2024-12-05 ENCOUNTER — HOSPITAL ENCOUNTER (OUTPATIENT)
Facility: HOSPITAL | Age: 79
Setting detail: RECURRING SERIES
Discharge: HOME | End: 2024-12-08
Payer: MEDICARE

## 2024-12-05 VITALS — WEIGHT: 277 LBS | BODY MASS INDEX: 34.62 KG/M2

## 2024-12-05 PROCEDURE — 93798 PHYS/QHP OP CAR RHAB W/ECG: CPT

## 2024-12-05 ASSESSMENT — EXERCISE STRESS TEST
PEAK_RPE: 13
PEAK_HR: 105
PEAK_METS: 3.4

## 2024-12-10 ENCOUNTER — APPOINTMENT (OUTPATIENT)
Facility: HOSPITAL | Age: 79
End: 2024-12-10
Payer: MEDICARE

## 2024-12-12 ENCOUNTER — HOSPITAL ENCOUNTER (OUTPATIENT)
Facility: HOSPITAL | Age: 79
Setting detail: RECURRING SERIES
Discharge: HOME | End: 2024-12-15
Payer: MEDICARE

## 2024-12-12 VITALS — WEIGHT: 277 LBS | BODY MASS INDEX: 34.62 KG/M2

## 2024-12-12 PROCEDURE — 93798 PHYS/QHP OP CAR RHAB W/ECG: CPT

## 2024-12-12 ASSESSMENT — EXERCISE STRESS TEST
PEAK_HR: 111
PEAK_METS: 3.4
PEAK_RPE: 13

## 2024-12-17 ENCOUNTER — HOSPITAL ENCOUNTER (OUTPATIENT)
Facility: HOSPITAL | Age: 79
Setting detail: RECURRING SERIES
Discharge: HOME | End: 2024-12-20
Payer: MEDICARE

## 2024-12-17 VITALS — WEIGHT: 275 LBS | BODY MASS INDEX: 34.37 KG/M2

## 2024-12-17 PROCEDURE — 93798 PHYS/QHP OP CAR RHAB W/ECG: CPT

## 2024-12-17 ASSESSMENT — EXERCISE STRESS TEST
PEAK_METS: 3.4
PEAK_HR: 112
PEAK_RPE: 13

## 2024-12-17 NOTE — CARDIO/PULMONARY
DISCHARGE SUMMARY NOTE  2024      NAME: Luis A Santos : 1945 AGE: 79 y.o.  GENDER: male    CARDIAC REHAB ADMITTING DIAGNOSIS: Chronic systolic (congestive) heart failure [I50.22]    REFERRING PHYSICIAN: Josh Ng MD    MEDICAL HX:  Past Medical History:   Diagnosis Date    Arrhythmia     had afib during covid was cardioverted and then ablation    Arthritis     knees, hands, shoulders    CAD (coronary artery disease)     COVID-19 2020    Family history of colon cancer     Paternal grandmother    Heart failure (HCC)     History of colon polyps     Hyperlipidemia     Hypertension     Sleep apnea     bipap    Thyroid disease     hypothyroidism       LABS:     No results found for: \"HBA1C\", \"WYF0IDED\"  No results found for: \"CHOL\", \"CHOLPOCT\", \"CHLST\", \"CHOLV\", \"HDL\", \"HDLPOC\", \"HDLC\", \"LDL\", \"LDLC\", \"VLDLC\", \"VLDL\"      ANTHROPOMETRICS:      Ht Readings from Last 1 Encounters:   24 1.905 m (6' 3\")      Wt Readings from Last 1 Encounters:   24 124.7 kg (275 lb)        WAIST: 49          PROGRAM SUMMARY:    Luis A Santos completed 36/36 sessions in the Cardiac Rehab program. He will continue exercising 3 x week and focus on diet and nutrition at home. He attended 3 classes and is aware of his cardiac risk factors and cardiac medications. Weight loss was 2 lbs. MET level increase from 2.4 to 3.4.   Questions addressed. Discharge plans discussed. Luis A Santos verbalized understanding.      Joselin Knowles, RN, RN

## 2024-12-19 ENCOUNTER — APPOINTMENT (OUTPATIENT)
Facility: HOSPITAL | Age: 79
End: 2024-12-19
Payer: MEDICARE

## 2025-01-02 DIAGNOSIS — E78.2 MIXED HYPERLIPIDEMIA: ICD-10-CM

## 2025-01-02 DIAGNOSIS — I10 ESSENTIAL HYPERTENSION, BENIGN: ICD-10-CM

## 2025-01-02 DIAGNOSIS — I48.91 ATRIAL FIBRILLATION, UNSPECIFIED TYPE (MULTI): ICD-10-CM

## 2025-01-02 DIAGNOSIS — I25.10 ATHEROSCLEROSIS OF NATIVE CORONARY ARTERY OF NATIVE HEART WITHOUT ANGINA PECTORIS: ICD-10-CM

## 2025-01-03 RX ORDER — APIXABAN 5 MG/1
5 TABLET, FILM COATED ORAL 2 TIMES DAILY
Qty: 180 TABLET | Refills: 3 | Status: SHIPPED | OUTPATIENT
Start: 2025-01-03

## 2025-01-03 RX ORDER — ROSUVASTATIN CALCIUM 40 MG/1
40 TABLET, COATED ORAL NIGHTLY
Qty: 90 TABLET | Refills: 3 | Status: SHIPPED | OUTPATIENT
Start: 2025-01-03

## 2025-01-03 RX ORDER — DOFETILIDE 0.5 MG/1
CAPSULE ORAL 2 TIMES DAILY
Qty: 180 CAPSULE | Refills: 3 | Status: SHIPPED | OUTPATIENT
Start: 2025-01-03

## 2025-01-03 RX ORDER — POTASSIUM CHLORIDE 20 MEQ/1
TABLET, EXTENDED RELEASE ORAL
Qty: 180 TABLET | Refills: 3 | Status: SHIPPED | OUTPATIENT
Start: 2025-01-03

## 2025-01-03 RX ORDER — DILTIAZEM HYDROCHLORIDE 240 MG/1
240 CAPSULE, COATED, EXTENDED RELEASE ORAL DAILY
Qty: 90 CAPSULE | Refills: 3 | Status: SHIPPED | OUTPATIENT
Start: 2025-01-03

## 2025-02-05 ENCOUNTER — HOSPITAL ENCOUNTER (OUTPATIENT)
Facility: HOSPITAL | Age: 80
Discharge: HOME | End: 2025-02-08
Attending: INTERNAL MEDICINE

## 2025-02-05 DIAGNOSIS — I42.9 PRIMARY CARDIOMYOPATHY (HCC): ICD-10-CM

## 2025-02-05 NOTE — PROGRESS NOTES
Kevin Cross on 2/5/2025 12:52 PM EST   Patient seen by physician in AM Normal Sinus. Presented with Afib when interrogated by the [employer] Rep Velásquez. Device could not be programmed per Cardiology Checklist- office stated that the device could be tuned off- when informed the patient was in Afib. Nursing not available to monitor and patient was informed to call their physician for treatment Due to safety concerns for the patient the can was cancelled.   SOTO

## 2025-02-26 ENCOUNTER — HOSPITAL ENCOUNTER (OUTPATIENT)
Facility: HOSPITAL | Age: 80
Discharge: HOME | End: 2025-03-01
Attending: INTERNAL MEDICINE

## 2025-02-26 VITALS
RESPIRATION RATE: 14 BRPM | SYSTOLIC BLOOD PRESSURE: 141 MMHG | DIASTOLIC BLOOD PRESSURE: 74 MMHG | WEIGHT: 275 LBS | OXYGEN SATURATION: 94 % | HEART RATE: 60 BPM | BODY MASS INDEX: 34.37 KG/M2

## 2025-02-26 NOTE — PROGRESS NOTES
Notified by MRI of MRI pacemaker case to be monitored.    Baseline vital signs obtained 60 paced with /52 BP 96% RA     pacemakercase: [employer] Pacemaker placed in safe mode by MRI technologist. Set Paced 70 per [employer] rep.    Patient identified with two identifiers and placed on the table in MRI and connected to SpO2, BP and ECG for monitoring throughout MRI. PIV placed per MRI tech.    Patient unable to complete study 2nd to with imaging quality with ICD and pacemaker/ICD returned to previous established settings by [employer] technologist.     Vital signs rechecked and back within patients normal parameters.    Patient discharged home.   Bradycardia

## 2025-05-02 ENCOUNTER — APPOINTMENT (OUTPATIENT)
Dept: CARDIOLOGY | Facility: CLINIC | Age: 80
End: 2025-05-02
Payer: MEDICARE

## 2025-05-28 ENCOUNTER — APPOINTMENT (OUTPATIENT)
Dept: CARDIOLOGY | Facility: CLINIC | Age: 80
End: 2025-05-28
Payer: MEDICARE

## 2025-05-30 ENCOUNTER — APPOINTMENT (OUTPATIENT)
Dept: CARDIOLOGY | Facility: CLINIC | Age: 80
End: 2025-05-30
Payer: MEDICARE

## 2025-05-30 VITALS
HEIGHT: 72 IN | SYSTOLIC BLOOD PRESSURE: 134 MMHG | BODY MASS INDEX: 28.85 KG/M2 | WEIGHT: 213 LBS | HEART RATE: 56 BPM | DIASTOLIC BLOOD PRESSURE: 77 MMHG

## 2025-05-30 DIAGNOSIS — I10 ESSENTIAL HYPERTENSION, BENIGN: ICD-10-CM

## 2025-05-30 DIAGNOSIS — G47.33 OSA ON CPAP: ICD-10-CM

## 2025-05-30 DIAGNOSIS — I48.91 ATRIAL FIBRILLATION, UNSPECIFIED TYPE (MULTI): ICD-10-CM

## 2025-05-30 DIAGNOSIS — I25.10 ATHEROSCLEROSIS OF NATIVE CORONARY ARTERY OF NATIVE HEART WITHOUT ANGINA PECTORIS: Primary | ICD-10-CM

## 2025-05-30 DIAGNOSIS — Z87.891 FORMER SMOKER: ICD-10-CM

## 2025-05-30 DIAGNOSIS — E78.2 MIXED HYPERLIPIDEMIA: ICD-10-CM

## 2025-05-30 RX ORDER — NITROGLYCERIN 0.4 MG/1
0.4 TABLET SUBLINGUAL EVERY 5 MIN PRN
Qty: 25 TABLET | Refills: 2 | Status: SHIPPED | OUTPATIENT
Start: 2025-05-30

## 2025-05-30 ASSESSMENT — ENCOUNTER SYMPTOMS: IRREGULAR HEARTBEAT: 1

## 2025-05-30 NOTE — PROGRESS NOTES
Chief Complaint   Patient presents with    Follow-up     9 month follow up for Atherosclerosis of native coronary artery of native heart without angina pectoris       Subjective   Robinson Resendiz is a 79 y.o. male     HPI        Patient is here for follow-up to management for coronary artery disease with remote PCI, hypertension, hyperlipidemia, persistent atrial fibrillation on long-term anticoagulation.  Since last time I saw him he described as a few brief episodes of breakthrough arrhythmia.  He denies chest pain, lightheadedness, dizziness or syncope.  He remains active.  Lab work was done last year in August and reviewed with him.  There has been no change in cardiac status or symptoms.  He continued to spend more than 6 months in [place].    ASSESSMENT:      1. Coronary artery disease with remote PCI to the LAD. No chest pain. Last cardiac catheterization in [place] showed no residual disease. Continues describe functional class I.  He is on appropriate therapy including aspirin and statin  2. Hypertension, blood pressure seem to be running a little bit on the higher range.  He is on  diltiazem  3. Hyperlipidemia, controlled on rosuvastatin maximum dose  4. Mildly overweight. With BMI of 28  5. Previous history of hypokalemia, resolved.  6. Persistent t atrial fibrillation status post prior cardioversion currently in normal sinus rhythm on dofetilide tolerating that well. QTc interval is acceptable. He is on long-term anticoagulation QTc intervals 451 ms he describes few breakthrough arrhythmia  7. Long-term anticoagulation with Eliquis due to atrial fibrillation tolerating that well.  No bleeding  8. Sleep apnea on CPAP     RECOMMENDATION:      1. The patient was advised to continue present medical therapy.  I advised him to continue to monitor her blood pressure at home once or twice a week.  I told him to notify me if blood pressure remain elevated will consider adding lisinopril or valsartan  2. The patient  will be seen back in the office when he comes back from [place] but I advised him to see his cardiologist in [place] and I advised him the guideline is to have an EKG done every 6-month  3. I advised him to repeat his lab work in August 4. Patient was advised to notify me change in cardiac status or symptoms  5.  I discussed with him the possibility of ablation at great length concerning he seemed to have occasional breakthrough arrhythmia the patient will think about it     Review of Systems   Cardiovascular:  Positive for irregular heartbeat.   All other systems reviewed and are negative.           Vitals:    05/30/25 0833 05/30/25 0903   BP: 146/80 134/77   BP Location: Left arm    Patient Position: Sitting    Pulse: 56    Weight: 96.6 kg (213 lb)    Height: 1.829 m (6')       EKG done in office today      Objective   Physical Exam  Constitutional:       Appearance: Normal appearance.   HENT:      Nose: Nose normal.   Neck:      Vascular: No carotid bruit.   Cardiovascular:      Rate and Rhythm: Normal rate.      Pulses: Normal pulses.      Heart sounds: Normal heart sounds.   Pulmonary:      Effort: Pulmonary effort is normal.   Abdominal:      General: Bowel sounds are normal.      Palpations: Abdomen is soft.   Musculoskeletal:         General: Normal range of motion.      Cervical back: Normal range of motion.      Right lower leg: No edema.      Left lower leg: No edema.   Skin:     General: Skin is warm and dry.   Neurological:      General: No focal deficit present.      Mental Status: He is alert.   Psychiatric:         Mood and Affect: Mood normal.         Behavior: Behavior normal.         Thought Content: Thought content normal.         Judgment: Judgment normal.         Allergies  Penicillins     Current Medications  Current Outpatient Medications   Medication Instructions    aspirin 81 mg    dilTIAZem CD (CARDIZEM CD) 240 mg, oral, Daily    dofetilide (Tikosyn) 500 mcg capsule oral, 2 times daily     Eliquis 5 mg, oral, 2 times daily    nitroglycerin (NITROSTAT) 0.4 mg, sublingual, Every 5 min PRN    potassium chloride CR 20 mEq ER tablet TAKE 2 TABLETS (40 MEQ) BY MOUTH ONCE DAILY.    rosuvastatin (CRESTOR) 40 mg, oral, Nightly                        Assessment/Plan   1. Atherosclerosis of native coronary artery of native heart without angina pectoris  Follow Up In Cardiology    Lipid Panel    Alanine Aminotransferase    Aspartate Aminotransferase    CBC    Basic Metabolic Panel    nitroglycerin (Nitrostat) 0.4 mg SL tablet    Lipid Panel    Alanine Aminotransferase    Aspartate Aminotransferase    CBC    Basic Metabolic Panel      2. Atrial fibrillation, unspecified type (Multi)  Follow Up In Cardiology    ECG 12 Lead      3. Mixed hyperlipidemia  Lipid Panel    Alanine Aminotransferase    Aspartate Aminotransferase    Lipid Panel    Alanine Aminotransferase    Aspartate Aminotransferase      4. Essential hypertension, benign  CBC    Basic Metabolic Panel    CBC    Basic Metabolic Panel      5. YESSENIA on CPAP        6. BMI 28.0-28.9,adult        7. Former smoker                 Scribe Attestation  By signing my name below, ISonja LPN  , Scribe   attest that this documentation has been prepared under the direction and in the presence of Tammy Diaz MD.     Provider Attestation - Scribe documentation    All medical record entries made by the Scribe were at my direction and personally dictated by me. I have reviewed the chart and agree that the record accurately reflects my personal performance of the history, physical exam, discussion and plan.

## 2025-05-30 NOTE — PATIENT INSTRUCTIONS
Please bring all medicines, vitamins, and herbal supplements with you when you come to the office.    Prescriptions will not be filled unless you are compliant with your follow up appointments or have a follow up appointment scheduled as per instruction of your physician. Refills should be requested at the time of your visit.   BMI was above normal measurement. Current weight: 96.6 kg (213 lb)  Weight change since last visit (-) denotes wt loss 3 lbs   Weight loss needed to achieve BMI 25: 29.1 Lbs  Weight loss needed to achieve BMI 30: -7.7 Lbs  Provided instructions on dietary changes.

## 2025-08-19 LAB
NON-UH HIE CHOL: 135 MG/DL (ref 120–200)
NON-UH HIE EGFR: 91 ML/MIN/1.73 M2
NON-UH HIE HDL: 51 MG/DL
NON-UH HIE LDL DIRECT: 74 MG/DL
NON-UH HIE TRIG: 105 MG/DL
NON-UH HIE VLDL: 21 MG/DL (ref 7–40)

## 2026-06-23 ENCOUNTER — APPOINTMENT (OUTPATIENT)
Dept: CARDIOLOGY | Facility: CLINIC | Age: 81
End: 2026-06-23
Payer: MEDICARE

## (undated) DEVICE — NEEDLE ANGIO 18GA L9CM NRML 1 WALL SMOOTH FINISH CLR HUB FOR

## (undated) DEVICE — DRESSING,GAUZE,XEROFORM,CURAD,1"X8",ST: Brand: CURAD

## (undated) DEVICE — GOWN,SIRUS,NONRNF,SETINSLV,2XL,18/CS: Brand: MEDLINE

## (undated) DEVICE — TOWEL,OR,DSP,ST,BLUE,STD,2/PK,40PK/CS: Brand: MEDLINE

## (undated) DEVICE — ZIMMER® STERILE DISPOSABLE TOURNIQUET CUFF WITH PROTECTIVE SLEEVE AND PLC, DUAL PORT, SINGLE BLADDER, 18 IN. (46 CM)

## (undated) DEVICE — 3M™ CUROS™ DISINFECTING CAP FOR NEEDLELESS CONNECTORS 270/CARTON 20 CARTONS/CASE CFF1-270: Brand: CUROS™

## (undated) DEVICE — ANGIO-SEAL VIP VASCULAR CLOSURE DEVICE: Brand: ANGIO-SEAL

## (undated) DEVICE — GLOVE ORANGE PI 7   MSG9070

## (undated) DEVICE — SET GRAV CK VLV NEEDLESS ST 3 GANGED 4WAY STPCOCK HI FLO 10

## (undated) DEVICE — GLOVE SURG SZ 75 L12IN FNGR THK79MIL GRN LTX FREE

## (undated) DEVICE — FORCEPS BX L240CM JAW DIA2.8MM L CAP W/ NDL MIC MESH TOOTH

## (undated) DEVICE — SUTURE MCRYL SZ 5-0 L18IN ABSRB UD L13MM P-3 3/8 CIR PRIM Y493G

## (undated) DEVICE — SENSOR OXMTR SPO2 ADLT NELLCOR DISP

## (undated) DEVICE — BANDAGE COMPR W3INXL5YD TAN POLY COHESIVE CARING SGL

## (undated) DEVICE — HAND-SFMCASU: Brand: MEDLINE INDUSTRIES, INC.

## (undated) DEVICE — SKIN PREP TRAY W/CHG: Brand: MEDLINE INDUSTRIES, INC.

## (undated) DEVICE — PACK PROCEDURE SURG HRT CATH

## (undated) DEVICE — CATH DIAG-D 6F MULTI PIG 155 5 -- IMPULSE 16599-302

## (undated) DEVICE — GLOVE SURG SZ 85 L12IN FNGR THK79MIL GRN LTX FREE

## (undated) DEVICE — GLOVE SURG SZ 8 CRM LTX FREE POLYISOPRENE POLYMER BEAD ANTI

## (undated) DEVICE — PINNACLE INTRODUCER SHEATH: Brand: PINNACLE

## (undated) DEVICE — CONTAINER SPEC 20 ML LID NEUT BUFF FORMALIN 10 % POLYPR STS

## (undated) DEVICE — PROCEDURE KIT FLUID MGMT CUST MAINFOLD STRL

## (undated) DEVICE — SOL IRRIGATION INJ NACL 0.9% 500ML BTL

## (undated) DEVICE — SINGLE-USE BIOPSY FORCEPS: Brand: RADIAL JAW 4